# Patient Record
Sex: FEMALE | Race: ASIAN | NOT HISPANIC OR LATINO | URBAN - METROPOLITAN AREA
[De-identification: names, ages, dates, MRNs, and addresses within clinical notes are randomized per-mention and may not be internally consistent; named-entity substitution may affect disease eponyms.]

---

## 2021-11-29 ENCOUNTER — INPATIENT (INPATIENT)
Facility: HOSPITAL | Age: 26
LOS: 7 days | Discharge: HOME | DRG: 885 | End: 2021-12-07
Attending: PSYCHIATRY & NEUROLOGY | Admitting: PSYCHIATRY & NEUROLOGY
Payer: COMMERCIAL

## 2021-11-29 VITALS
HEIGHT: 66 IN | RESPIRATION RATE: 18 BRPM | OXYGEN SATURATION: 100 % | HEART RATE: 86 BPM | DIASTOLIC BLOOD PRESSURE: 74 MMHG | WEIGHT: 190.04 LBS | SYSTOLIC BLOOD PRESSURE: 101 MMHG

## 2021-11-29 LAB
ALBUMIN SERPL ELPH-MCNC: 3.1 G/DL — LOW (ref 3.4–5)
ALP SERPL-CCNC: 52 U/L — SIGNIFICANT CHANGE UP (ref 40–120)
ALT FLD-CCNC: 42 U/L — SIGNIFICANT CHANGE UP (ref 12–42)
ANION GAP SERPL CALC-SCNC: 9 MMOL/L — SIGNIFICANT CHANGE UP (ref 9–16)
APAP SERPL-MCNC: <2 UG/ML — LOW (ref 10–30)
AST SERPL-CCNC: 18 U/L — SIGNIFICANT CHANGE UP (ref 15–37)
BASOPHILS # BLD AUTO: 0.06 K/UL — SIGNIFICANT CHANGE UP (ref 0–0.2)
BASOPHILS NFR BLD AUTO: 0.4 % — SIGNIFICANT CHANGE UP (ref 0–2)
BILIRUB SERPL-MCNC: 0.3 MG/DL — SIGNIFICANT CHANGE UP (ref 0.2–1.2)
BUN SERPL-MCNC: 11 MG/DL — SIGNIFICANT CHANGE UP (ref 7–23)
CALCIUM SERPL-MCNC: 8.8 MG/DL — SIGNIFICANT CHANGE UP (ref 8.5–10.5)
CHLORIDE SERPL-SCNC: 105 MMOL/L — SIGNIFICANT CHANGE UP (ref 96–108)
CO2 SERPL-SCNC: 24 MMOL/L — SIGNIFICANT CHANGE UP (ref 22–31)
CREAT SERPL-MCNC: 0.81 MG/DL — SIGNIFICANT CHANGE UP (ref 0.5–1.3)
EOSINOPHIL # BLD AUTO: 0.17 K/UL — SIGNIFICANT CHANGE UP (ref 0–0.5)
EOSINOPHIL NFR BLD AUTO: 1 % — SIGNIFICANT CHANGE UP (ref 0–6)
ETHANOL SERPL-MCNC: <3 MG/DL — SIGNIFICANT CHANGE UP
GLUCOSE SERPL-MCNC: 97 MG/DL — SIGNIFICANT CHANGE UP (ref 70–99)
HCT VFR BLD CALC: 36.7 % — SIGNIFICANT CHANGE UP (ref 34.5–45)
HGB BLD-MCNC: 11.3 G/DL — LOW (ref 11.5–15.5)
IMM GRANULOCYTES NFR BLD AUTO: 0.6 % — SIGNIFICANT CHANGE UP (ref 0–1.5)
LYMPHOCYTES # BLD AUTO: 13.1 % — SIGNIFICANT CHANGE UP (ref 13–44)
LYMPHOCYTES # BLD AUTO: 2.17 K/UL — SIGNIFICANT CHANGE UP (ref 1–3.3)
MAGNESIUM SERPL-MCNC: 2.3 MG/DL — SIGNIFICANT CHANGE UP (ref 1.6–2.6)
MANUAL SMEAR VERIFICATION: SIGNIFICANT CHANGE UP
MCHC RBC-ENTMCNC: 25.6 PG — LOW (ref 27–34)
MCHC RBC-ENTMCNC: 30.8 GM/DL — LOW (ref 32–36)
MCV RBC AUTO: 83.2 FL — SIGNIFICANT CHANGE UP (ref 80–100)
MONOCYTES # BLD AUTO: 1.45 K/UL — HIGH (ref 0–0.9)
MONOCYTES NFR BLD AUTO: 8.7 % — SIGNIFICANT CHANGE UP (ref 2–14)
NEUTROPHILS # BLD AUTO: 12.66 K/UL — HIGH (ref 1.8–7.4)
NEUTROPHILS NFR BLD AUTO: 76.2 % — SIGNIFICANT CHANGE UP (ref 43–77)
NRBC # BLD: 0 /100 WBCS — SIGNIFICANT CHANGE UP (ref 0–0)
PCO2 BLDV: 51 MMHG — HIGH (ref 39–42)
PH BLDV: 7.37 — SIGNIFICANT CHANGE UP (ref 7.32–7.43)
PLAT MORPH BLD: ABNORMAL
PLATELET # BLD AUTO: 272 K/UL — SIGNIFICANT CHANGE UP (ref 150–400)
PLATELET CLUMP BLD QL SMEAR: SLIGHT
PO2 BLDV: 51 MMHG — HIGH (ref 25–45)
POTASSIUM SERPL-MCNC: 3.5 MMOL/L — SIGNIFICANT CHANGE UP (ref 3.5–5.3)
POTASSIUM SERPL-SCNC: 3.5 MMOL/L — SIGNIFICANT CHANGE UP (ref 3.5–5.3)
PROT SERPL-MCNC: 7.3 G/DL — SIGNIFICANT CHANGE UP (ref 6.4–8.2)
RBC # BLD: 4.41 M/UL — SIGNIFICANT CHANGE UP (ref 3.8–5.2)
RBC # FLD: 14.4 % — SIGNIFICANT CHANGE UP (ref 10.3–14.5)
RBC BLD AUTO: NORMAL — SIGNIFICANT CHANGE UP
SALICYLATES SERPL-MCNC: 0.6 MG/DL — LOW (ref 2.8–20)
SAO2 % BLDV: 81.5 % — SIGNIFICANT CHANGE UP (ref 67–88)
SARS-COV-2 RNA SPEC QL NAA+PROBE: SIGNIFICANT CHANGE UP
SODIUM SERPL-SCNC: 138 MMOL/L — SIGNIFICANT CHANGE UP (ref 132–145)
WBC # BLD: 16.61 K/UL — HIGH (ref 3.8–10.5)
WBC # FLD AUTO: 16.61 K/UL — HIGH (ref 3.8–10.5)

## 2021-11-29 PROCEDURE — 90792 PSYCH DIAG EVAL W/MED SRVCS: CPT | Mod: 95

## 2021-11-29 PROCEDURE — 99220: CPT

## 2021-11-29 PROCEDURE — 71045 X-RAY EXAM CHEST 1 VIEW: CPT | Mod: 26

## 2021-11-29 PROCEDURE — 93010 ELECTROCARDIOGRAM REPORT: CPT

## 2021-11-29 RX ORDER — SODIUM CHLORIDE 9 MG/ML
1000 INJECTION, SOLUTION INTRAVENOUS ONCE
Refills: 0 | Status: COMPLETED | OUTPATIENT
Start: 2021-11-29 | End: 2021-11-29

## 2021-11-29 RX ADMIN — SODIUM CHLORIDE 1000 MILLILITER(S): 9 INJECTION, SOLUTION INTRAVENOUS at 20:17

## 2021-11-29 RX ADMIN — SODIUM CHLORIDE 1000 MILLILITER(S): 9 INJECTION, SOLUTION INTRAVENOUS at 22:00

## 2021-11-29 NOTE — ED PROVIDER NOTE - CLINICAL SUMMARY MEDICAL DECISION MAKING FREE TEXT BOX
Intentional drug overdose - xanax and Aripiprazole at 4 pm.  Hx of Bipolar II, UC.  Presents with somnolence and sleepiness.  No vomiting, no seizures.  Accompanied by friend.  On arrival, placed on 1:1.  Sleep, AVPU - verbal, PERRL, no rigidity.  Lungs clear, no abdominal distention.  Placed on cardiac monitor, labs and imaging.  Reviewed with toxicology fellow Dr. Stacy.  Recommends 5-6 hour observation and improved mental status prior to medical clearance for psychiatry evaluation.

## 2021-11-29 NOTE — ED ADULT NURSE NOTE - OBJECTIVE STATEMENT
Patient presented to the ED with cc of SI attempt. Patient reports taking 8 x 0.25mg Klonipin and 20-25x 5mg Abilify. Patient reports one previous SI attempt with pills in 2016. Patient has a PMH of UC, depression, and BV.

## 2021-11-29 NOTE — ED PROVIDER NOTE - CONSTITUTIONAL, MLM
Sleepy, rouses to verbal.  oriented to person, place, time/situation and in no apparent distress. normal...

## 2021-11-29 NOTE — ED PROVIDER NOTE - OBJECTIVE STATEMENT
BIBEMS for evaluation with friend for drug overdose.  As per friend received a text message around 430 pm that she wasn't feeling well.  When she arrived she found the patient to be sleepy appearing.  No vomiting appreciated.  Patient states that she has been more depressed over the past week with stressors at home with family being in [place], pressures at graduate school, and the holiday season.   Has been treated with prednisone for recent UC flare and flagyl for BV infection.   Around 4pm took 8 0.25 mg xanax pills and 20-25 5mg Abilify tablets together.  Denies other coingestion or alcohol use.   Something similar happened in [place] in 2016.  Denies other acts of self harm. BIBEMS for evaluation with friend for drug overdose.  As per friend received a text message around 430 pm that she wasn't feeling well.  When she arrived she found the patient to be sleepy appearing.  No vomiting appreciated.  History of Bipolar II and UC.  Patient states that she has been more depressed over the past week with stressors at home with family being in [place], pressures at graduate school, and the holiday season.   Has been treated with prednisone for recent UC flare and flagyl for BV infection.   Around 4pm took 8 0.25 mg xanax pills and 20-25 5mg Abilify tablets together.  Denies other coingestion or alcohol use.   Something similar happened in [place] in 2016.  Denies other acts of self harm.

## 2021-11-29 NOTE — ED CDU PROVIDER INITIAL DAY NOTE - PROGRESS NOTE DETAILS
Spoke with Telepsychiatry attending to present case for consult. [facility] requested legals for voluntary admission, patient signed, and voluntary/ amenable to bed placement. clinically stable for admission and transfer to psychiatry. no overnight events. bed search in progress per psych, will likely be placed this morning.

## 2021-11-29 NOTE — ED ADULT TRIAGE NOTE - CHIEF COMPLAINT QUOTE
Pt BIBEMS from home for suicide attempt. Pt states that she took about 30 pills of Klonopin and Abilify in attempt to kill herself. PT denies HI, auditory/visual hallucinations. PT states that she had a suicide attempt in 2016 and was hospitalized in [place] at the time.

## 2021-11-29 NOTE — ED ADULT NURSE REASSESSMENT NOTE - NS ED NURSE REASSESS COMMENT FT1
Pt received from previous shift RN. sleeping in stretcher, easily arousable. calm, in nad, respirations spont/reg/unlabored on RA. remains on CM, pending lab results and dispo  remains on 1:1 for SA

## 2021-11-29 NOTE — ED ADULT NURSE NOTE - SUICIDE RISK FACTORS
Hopelessness or despair/Chronic pain/Access to lethal methods (pills, firearm, etc.: Ask specifically about presence or absence of a firearm in the home or ease of accessing/Mood Disorder current/past

## 2021-11-29 NOTE — ED ADULT NURSE NOTE - NSIMPLEMENTINTERV_GEN_ALL_ED
Implemented All Universal Safety Interventions:  [place] to call system. Call bell, personal items and telephone within reach. Instruct patient to call for assistance. Room bathroom lighting operational. Non-slip footwear when patient is off stretcher. Physically safe environment: no spills, clutter or unnecessary equipment. Stretcher in lowest position, wheels locked, appropriate side rails in place.

## 2021-11-30 DIAGNOSIS — F32.9 MAJOR DEPRESSIVE DISORDER, SINGLE EPISODE, UNSPECIFIED: ICD-10-CM

## 2021-11-30 DIAGNOSIS — F60.3 BORDERLINE PERSONALITY DISORDER: ICD-10-CM

## 2021-11-30 DIAGNOSIS — F32.2 MAJOR DEPRESSIVE DISORDER, SINGLE EPISODE, SEVERE WITHOUT PSYCHOTIC FEATURES: ICD-10-CM

## 2021-11-30 LAB
AMPHET UR-MCNC: NEGATIVE — SIGNIFICANT CHANGE UP
BARBITURATES UR SCN-MCNC: NEGATIVE — SIGNIFICANT CHANGE UP
BENZODIAZ UR-MCNC: NEGATIVE — SIGNIFICANT CHANGE UP
COCAINE METAB.OTHER UR-MCNC: NEGATIVE — SIGNIFICANT CHANGE UP
HCG UR QL: NEGATIVE — SIGNIFICANT CHANGE UP
METHADONE UR-MCNC: NEGATIVE — SIGNIFICANT CHANGE UP
OPIATES UR-MCNC: NEGATIVE — SIGNIFICANT CHANGE UP
PCP SPEC-MCNC: SIGNIFICANT CHANGE UP
PCP UR-MCNC: NEGATIVE — SIGNIFICANT CHANGE UP
THC UR QL: NEGATIVE — SIGNIFICANT CHANGE UP

## 2021-11-30 PROCEDURE — 99217: CPT

## 2021-11-30 RX ORDER — ACETAMINOPHEN 500 MG
650 TABLET ORAL EVERY 6 HOURS
Refills: 0 | Status: DISCONTINUED | OUTPATIENT
Start: 2021-11-30 | End: 2021-12-07

## 2021-11-30 RX ORDER — MAGNESIUM HYDROXIDE 400 MG/1
30 TABLET, CHEWABLE ORAL DAILY
Refills: 0 | Status: DISCONTINUED | OUTPATIENT
Start: 2021-11-30 | End: 2021-12-07

## 2021-11-30 RX ORDER — LANOLIN ALCOHOL/MO/W.PET/CERES
3 CREAM (GRAM) TOPICAL AT BEDTIME
Refills: 0 | Status: DISCONTINUED | OUTPATIENT
Start: 2021-11-30 | End: 2021-12-04

## 2021-11-30 NOTE — PROGRESS NOTE BEHAVIORAL HEALTH - NSBHFUPINTERVALHXFT_PSY_A_CORE
chart reviewed; case discussed with primary team and overnight psychiatrist.   patient was interviewed for 15 minutes. She says that she is feeling somewhat better, but reiterates the history collected last evening by primary psychiatrist. She says that she has been feeling increasingly depressed and overwhelmed by a variety of stressors including physical illness (Crohns with intractable diarrhea), as well as isolation from family, not being able to travel for Thanksgiving and separation from boyfriend. She says that last night she became suicidal, and contacted her friend to take her dog away so that the dog would be cared for after she was dead. The friend refused to take the dog, saying she'd be fine, and she says "I don't think he understood the extent of my problem". Subsequently, her brother had indicated he would come over to be with her. At that point, she said that she felt it would be fine to die because somebody would find the dog and he would be cared for. She then took an overdose of xanax and klonopin hoping to sleep. When that didn't work, she then started to take handfuls of leftover abilify from a prior prescription. She says that she didn't know if she would die or not, but was fine with that outcome. She believes she took around 30 pills total between the benzos and the abilify. Right now she is ambivalent about being alive.     Spoke in person with pt's brother, who was in support of plan for admission. Answered his questions about the admissions process. Supportive interventions provided.

## 2021-11-30 NOTE — ED BEHAVIORAL HEALTH ASSESSMENT NOTE - OTHER PAST PSYCHIATRIC HISTORY (INCLUDE DETAILS REGARDING ONSET, COURSE OF ILLNESS, INPATIENT/OUTPATIENT TREATMENT)
Hx of Bipolar 2, told in the past possible Borderline Personality d/o. Saw a psychiatrist in [place] for about 6 years. 1 prior inpatient hospitalization in [place] in 2016 after suicide attempt by overdose.

## 2021-11-30 NOTE — ED BEHAVIORAL HEALTH ASSESSMENT NOTE - HPI (INCLUDE ILLNESS QUALITY, SEVERITY, DURATION, TIMING, CONTEXT, MODIFYING FACTORS, ASSOCIATED SIGNS AND SYMPTOMS)
Patient is a 26 year old student in a relationship domiciled alone non-caregiver with a history of Bipolar 2 d/o, 1 prior hospitalization, 1 prior suicide attempt by overdose in 2016 and hx self-injury by cutting, no past violence/arrests, no substance abuse issues, past medical history of Crohn's disease and ulcerative colitis BIBEMS activated by friend after overdose at home.     Patient states she felt like she was at her baseline until about 10 days ago when she was faced with multiple acute stressors. She had a flare up of her Crohn's disease around that time, states she has essentially been bed ridden with nausea/body aches/diarrhea with blood in stool. She had to miss returning home to [place] where she was supposed to celebrate Thanksgiving with her family and celebrate her brother's engagement, she also missed her best friend's wedding due to this. Patient also indicates compounding stress because she hasn't been able to focus on her graduate school finals which are in a few weeks. She reports feeling anxious thinking about what may happen and low mood because she is missing all these events that she is able to see on social media. She endorses low motivation and she has been sleeping up to 18 hours/day with poor hygiene. Patient reports poor social support as her [place] moved to the [place] for family reasons a few months ago and the rest of her family primarily lives in [place]. She states she has had low appetite especially due to physical GI symptoms. States during this period of 10 days she had intermittent passive "fleeting thoughts" of self-harm but didn't act. Admits that she took a few klonopin she had leftover during this period to help with the anxiety without harmful intent. Patient denies any psychotic/manic symptoms, no additional SIB, no HI/violence, no substance abuse. Today, patient reports She was feeling frustrated and "couldn't handle it anymore", "just wanted to sleep" so she reports taking about 8 Klonopin and 20-30 Abilify of her leftover medications. Patient is vague when asked whether this was a suicide attempt, states at this point she is unsure why she took them. Patient reports she is currently not suicidal while in the ED. She states shortly after taking the pills she called her friend and her brother, her friend arrived and reportedly found the pills and activated EMS.     She reports that she had been on Abilify 5mg for 6-8 months until she discontinued about 2-3 months ago because she felt she was doing well. She currently isn't seeing a therapist or psychiatrist, had stopped around the time of COVID. Reports she was trying to get an appointment with a psychiatrist in recent days but has been unable to. Patient states that she is hoping to re-start medication, is willing to be admitted at this time for stabilization.     Spoke with patient's brother who was present in the ED, states he doesn't have the full details of patient's psychiatric history but knows she was in treatment in [place] and had a hospitalization. He wasn't aware of past SA and wasn't aware of what had occurred today. He notes patient in recent weeks has definitely seemed more withdrawn from family than normal and that she was having GI issues so she missed important family events. Brother is supportive of admission for patient at this time.    Patient gives permission to obtain collateral from _Brother____:  ( x ) Yes  (  )  No  Rationale for overriding objection            (  ) Lack of capacity. Details: ________            (  ) Assessing risk of danger to self/others. Details: ________      Rationale for selecting specific collateral source            (  ) Potential to impact risk of danger to self/others and no alternative equivalent. Details: _____ Patient is a 26 year old student in a relationship domiciled alone non-caregiver with a history of Bipolar 2 d/o, 1 prior hospitalization, 1 prior suicide attempt by overdose in 2016 and hx self-injury by cutting, no past violence/arrests, no substance abuse issues, past medical history of Crohn's disease and ulcerative colitis BIBEMS activated by friend after overdose at home with Xanex approx 8.25mg Xanex and 20-25 pills of Abilify 5mg .     Patient states she felt like she was at her baseline until about 10 days ago when she was faced with multiple acute stressors. She had a flare up of her Crohn's disease around that time, states she has essentially been bed ridden with nausea/body aches/diarrhea with blood in stool. She had to miss returning home to [place] where she was supposed to celebrate Thanksgiving with her family and celebrate her brother's engagement, she also missed her best friend's wedding due to this. Patient also indicates compounding stress because she hasn't been able to focus on her graduate school finals which are in a few weeks. She reports feeling anxious thinking about what may happen and low mood because she is missing all these events that she is able to see on social media. She endorses low motivation and she has been sleeping up to 18 hours/day with poor hygiene. Patient reports poor social support as her partner moved to the [place] for family reasons a few months ago and the rest of her family primarily lives in [place]. She states she has had low appetite especially due to physical GI symptoms. States during this period of 10 days she had intermittent passive "fleeting thoughts" of self-harm but didn't act. Admits that she took a few klonopin she had leftover during this period to help with the anxiety without harmful intent. Patient denies any psychotic/manic symptoms, no additional SIB, no HI/violence, no substance abuse. Today, patient reports She was feeling frustrated and "couldn't handle it anymore", "just wanted to sleep" so she reports taking about 8 Klonopin/Xanex? and 20-30 Abilify of her leftover medications. Patient is vague when asked whether this was a suicide attempt-- appeared guarded, states at this point she is unsure why she took them. Patient reports she is currently not suicidal while in the ED. She states shortly after taking the pills she called her friend and her brother, her friend arrived and reportedly found the pills and activated EMS.     She reports that she had been on Abilify 5mg for 6-8 months until she discontinued about 2-3 months ago because she felt she was doing well. She currently isn't seeing a therapist or psychiatrist, had stopped around the time of COVID. Reports she was trying to get an appointment with a psychiatrist in recent days but has been unable to. Patient states that she is hoping to re-start medication, is willing to be admitted at this time for stabilization.     Spoke with patient's brother who was present in the ED, states he doesn't have the full details of patient's psychiatric history but knows she was in treatment in [place] and had a hospitalization. He wasn't aware of past SA and wasn't aware of what had occurred today. He notes patient in recent weeks has definitely seemed more withdrawn from family than normal and that she was having GI issues so she missed important family events. Brother is supportive of admission for patient at this time.    Patient gives permission to obtain collateral from _Brother____:  ( x ) Yes  (  )  No  Rationale for overriding objection            (  ) Lack of capacity. Details: ________            (  ) Assessing risk of danger to self/others. Details: ________      Rationale for selecting specific collateral source            (  ) Potential to impact risk of danger to self/others and no alternative equivalent. Details: _____

## 2021-11-30 NOTE — ED ADULT NURSE REASSESSMENT NOTE - NS ED NURSE REASSESS COMMENT FT1
Pt awaiting bed placement for a voluntary psych admission at this time. Pt asked this RN if its possible to rescind decision, due to long wait for bed. Provider made aware
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
## 2021-11-30 NOTE — ED BEHAVIORAL HEALTH ASSESSMENT NOTE - SUMMARY
Patient is a 26 year old student in a relationship domiciled alone non-caregiver with a history of Bipolar 2 d/o, 1 prior hospitalization, 1 prior suicide attempt by overdose in 2016 and hx self-injury by cutting, no past violence/arrests, no substance abuse issues, past medical history of Crohn's disease and ulcerative colitis BIBEMS activated by friend after overdose at home with Xanex approx 8.25mg Xanex and 20-25 pills of Abilify 5mg .     Patient currently presents with depressive symptoms in the context of recent OD, suspected suicide attempt. Patient was guarded and not really able to discuss or safety plan. Although she currently denies any SI in the ER, attempt is concerning and will plan on admitting to psychiatry 9.27 involuntary for safety and stabilization via titration of medications. Currently is not safe to be discharged, and there are a number of ongoing stressors (medical, poor social support).     Discussed with ER elevated wbc 16. Per ER suspect demarginalization secondary to stress as the cause, no signs of infection.

## 2021-11-30 NOTE — PROGRESS NOTE BEHAVIORAL HEALTH - NSBHFUPREASONCONS_PSY_A_CORE
DISCHARGE SUMMARY:   Discharge Time: 1030  Via:  carseat: Rear-facing carseat   Accompanied by:  Parents  Verbal Discharge Instructions given to:parents  Verbalized understanding: Yes  Written Instructions given: Yes  Baby Bands Matched and Signature Obtained: Yes  Discharged Stable Per MD Order: Yes       suicidality

## 2021-11-30 NOTE — ED ADULT NURSE REASSESSMENT NOTE - NS ED NURSE REASSESS COMMENT FT1
report received from previous RN, pt sleeping at this time, remains on continuous observation, speaking to telepsych. Will continue to monitor.

## 2021-11-30 NOTE — BH PATIENT PROFILE - FALL HARM RISK - UNIVERSAL INTERVENTIONS
Bed in lowest position, wheels locked, appropriate side rails in place/Call bell, personal items and telephone in reach/Instruct patient to call for assistance before getting out of bed or chair/Non-slip footwear when patient is out of bed/[place] to call system/Physically safe environment - no spills, clutter or unnecessary equipment/Purposeful Proactive Rounding/Room/bathroom lighting operational, light cord in reach

## 2021-11-30 NOTE — ED BEHAVIORAL HEALTH ASSESSMENT NOTE - DESCRIPTION
Patient BIBEMS, alert and oriented, cooperative. Per triage indicated this was a suicide attempt by overdose, depressed mood with congruent affect, no HI, not overtly psychotic/manic, thought process/speech WNL, patient remained in good behavioral control prior to assessment.    COVID SCREENER:    Patient states no recent testing/anitbodies, received 2 doses of Pfizer, 2nd dose several months ago, no recent travel out of state or exposure to anyone with COVID. Resides alone. Has a boyfriend who resides in the [place]. 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 Patient's family primarily resides in [place]. Patient returned from [place] in August after a long period to attend school. Patient never , no children. see above hpi

## 2021-11-30 NOTE — ED BEHAVIORAL HEALTH ASSESSMENT NOTE - RISK ASSESSMENT
RF: poor social support, hx of hospitalization, medical illness, hx self injury and cutting, hx Sa via OD not able to reflect on past events or discuss, not able to safety plan   PF: currently denies SI Moderate Acute Suicide Risk

## 2021-11-30 NOTE — ED BEHAVIORAL HEALTH ASSESSMENT NOTE - CURRENT MEDICATION
Abilify 5 and [place]    iSTOP:  Search Terms: Roosevelt [place], 1995Search Date: 11/30/2021 04:22:48 AM  Searching on behalf of: Myself  The Drug Utilization Report below displays all of the controlled substance prescriptions, if any, that your patient has filled in the last twelve months. The information displayed on this report is compiled from pharmacy submissions to the Department, and accurately reflects the information as submitted by the pharmacies.    This report was requested by: Serina Shine | Reference #: [id number]    There are no results for the search terms that you entered.

## 2021-11-30 NOTE — ED BEHAVIORAL HEALTH ASSESSMENT NOTE - CASE SUMMARY
25yo F, student, domiciled alone, away from boyfriend and family, medical history of Crohn's disease and UC, past psychiatric history of Bipolar 2, 1 prior psych hospitalization, 1 prior SA via OD, and history of cutting, no hx aggression or substance who was BIBEMS activated by friend after she was found down following OD of benzo and Abilify. Given concerns that OD was SA in the context of stressors (being alone, medical issues, stress) will plan on involuntary psych admission for safety and stabilization. Not really able to openly discuss SA during this interview and patient presented more guarded, denying SI. Not able to safety plan either.

## 2021-11-30 NOTE — PROGRESS NOTE BEHAVIORAL HEALTH - SUMMARY
26 year old student in a relationship domiciled alone non-caregiver with a history of Bipolar 2 d/o, 1 prior hospitalization, 1 prior suicide attempt by overdose in 2016 and hx self-injury by cutting, no past violence/arrests, no substance abuse issues, past medical history of Crohn's disease and ulcerative colitis BIBEMS activated by friend after overdose at home with Xanex approx 8.25mg Xanex and 20-25 pills of Abilify 5mg .   Patient slept overnight but remains ambivalent about symptoms and treatment. She is clear about her suicidal intent, with acts of furtherance to secure care for her pet and subsequently making large overdose with intent to die. She will require admission for safety and stabilization.

## 2021-12-01 LAB
A1C WITH ESTIMATED AVERAGE GLUCOSE RESULT: 5.9 % — HIGH (ref 4–5.6)
ESTIMATED AVERAGE GLUCOSE: 123 MG/DL — HIGH (ref 68–114)
HCG SERPL-ACNC: <0 MIU/ML — SIGNIFICANT CHANGE UP
MAGNESIUM SERPL-MCNC: 2.1 MG/DL — SIGNIFICANT CHANGE UP (ref 1.6–2.6)
TSH SERPL-MCNC: 1.35 UIU/ML — SIGNIFICANT CHANGE UP (ref 0.27–4.2)

## 2021-12-01 PROCEDURE — 99223 1ST HOSP IP/OBS HIGH 75: CPT

## 2021-12-01 RX ORDER — TRAZODONE HCL 50 MG
50 TABLET ORAL DAILY
Refills: 0 | Status: DISCONTINUED | OUTPATIENT
Start: 2021-12-01 | End: 2021-12-07

## 2021-12-01 NOTE — BH SOCIAL WORK INITIAL PSYCHOSOCIAL EVALUATION - NSPTSTATEDGOAL_PSY_ALL_CORE
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

## 2021-12-01 NOTE — BH SOCIAL WORK INITIAL PSYCHOSOCIAL EVALUATION - NSBHGOALSUCCESSFT_PSY_ALL_CORE
Patient has sought help after a suicide attempt in 2013 and after depressive/hypomanic episodes. Patient is able to identify symptoms, has good insight, and triggers.

## 2021-12-01 NOTE — BH INPATIENT PSYCHIATRY ASSESSMENT NOTE - RISK ASSESSMENT
Static: hx of mental illness, prior hx of suicide attempt, family hx of depression  Modifiable: current mood sxs, access to treatment/care  Protective: involved family, stable housing
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
## 2021-12-01 NOTE — BH SOCIAL WORK INITIAL PSYCHOSOCIAL EVALUATION - NSHIGHRISKBEHFT_PSY_ALL_CORE
Previous suicide attempt in 2013 by overdose on Abilify  Previous suicide attempt in 2013 by overdose on medication

## 2021-12-01 NOTE — BH INPATIENT PSYCHIATRY ASSESSMENT NOTE - DESCRIPTION
Resides alone. Has a boyfriend who resides in the [place]. 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 Patient's family primarily resides in [place]. Patient returned from [place] in August after a long period to attend school. Patient never , no children.

## 2021-12-01 NOTE — BH INPATIENT PSYCHIATRY ASSESSMENT NOTE - CURRENT MEDICATION
MEDICATIONS  (STANDING):    MEDICATIONS  (PRN):  acetaminophen     Tablet .. 650 milliGRAM(s) Oral every 6 hours PRN Moderate Pain (4 - 6)  aluminum hydroxide/magnesium hydroxide/simethicone Suspension 30 milliLiter(s) Oral every 6 hours PRN Dyspepsia  LORazepam     Tablet 2 milliGRAM(s) Oral every 6 hours PRN Symptom-triggered: each CIWA -Ar score 8 or GREATER  magnesium hydroxide Suspension 30 milliLiter(s) Oral daily PRN Constipation  melatonin. 3 milliGRAM(s) Oral at bedtime PRN Insomnia

## 2021-12-01 NOTE — CHART NOTE - NSCHARTNOTEFT_GEN_A_CORE
As per ED case formulation: " 26 year old student in a relationship domiciled alone non-caregiver with a history of Bipolar 2 d/o, 1 prior hospitalization, 1 prior suicide attempt by overdose in 2016 and hx self-injury by cutting, no past violence/arrests, no substance abuse issues, past medical history of Crohn's disease and ulcerative colitis BIBEMS activated by friend after overdose at home with Xanex approx 8.25mg Xanex and 20-25 pills of Abilify 5mg .   Patient slept overnight but remains ambivalent about symptoms and treatment. She is clear about her suicidal intent, with acts of furtherance to secure care for her pet and subsequently making large overdose with intent to die. She will require admission for safety and stabilization."    Patient arrive in behavioral control. VSS. Denies any physical symptoms, side effects, or withdrawal symptoms. Asked when she could be discharged. Patient denies SI/HI. Agreeable to be seen tomorrow morning by primary team. Of note, documentation from ED indicates that patient is for involuntary admission but she signed voluntary status instead    Plan:  - Admit under voluntary status  - Hold all medications currently following recent suicide attempt  - Ativan 2mg q8hr PRN CIWA >8 due to recent benzodiazepine overdose  - Tylenol, maalox, milk of magnesia, melatonin PRN
[facility]  PHYSICAL EXAM: Agree/Declined    VITALS: T(C): 36.6 (11-30-21 @ 20:19), Max: 36.8 (11-30-21 @ 09:27)  HR: 90 (11-30-21 @ 20:19) (84 - 100)  BP: 111/76 (11-30-21 @ 20:19) (106/64 - 112/74)  RR: 16 (11-30-21 @ 20:19) (16 - 16)  SpO2: 99% (11-30-21 @ 20:19) (98% - 99%)      GENERAL: NAD, comfortable, ambulating  HEAD:  Atraumatic, Normocephalic  EYES: EOMI, PERRLA, conjunctiva and sclera clear  ENT: Moist mucous membranes  NECK: Supple, No JVD  CHEST/LUNG: Clear to auscultation bilaterally; No rales, rhonchi, wheezing, or rubs. Unlabored respirations  HEART: Regular rate and rhythm; No murmurs, rubs, or gallops  ABDOMEN: BSx4; Soft, nontender, nondistended  EXTREMITIES:  No clubbing, cyanosis, or edema  NERVOUS SYSTEM:  A&Ox3, no focal deficits   SKIN: No rashes or lesions
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
## 2021-12-01 NOTE — BH INPATIENT PSYCHIATRY ASSESSMENT NOTE - NSBHCHARTREVIEWVS_PSY_A_CORE FT
Vital Signs Last 24 Hrs  T(C): 36.8 (12-01-21 @ 08:45), Max: 36.8 (12-01-21 @ 08:45)  T(F): 98.3 (12-01-21 @ 08:45), Max: 98.3 (12-01-21 @ 08:45)  HR: 98 (12-01-21 @ 08:45) (90 - 98)  BP: 115/66 (12-01-21 @ 08:45) (109/71 - 115/66)  BP(mean): --  RR: 16 (12-01-21 @ 08:45) (16 - 16)  SpO2: 98% (12-01-21 @ 08:45) (98% - 99%)

## 2021-12-01 NOTE — BH SOCIAL WORK INITIAL PSYCHOSOCIAL EVALUATION - NSCMSPTSTRENGTHS_PSY_ALL_CORE
Assertive/Compliance to treatment/Expressive of emotions/Financial stability/Future/goal oriented/Highly motivated for treatment/Intelligence/Resourceful/Self confidence/Strong support system/Successful coping history/Supportive family

## 2021-12-01 NOTE — BH INPATIENT PSYCHIATRY ASSESSMENT NOTE - NSBHMETABOLIC_PSY_ALL_CORE_FT
BMI: BMI (kg/m2): 30.7 (11-29-21 @ 19:06)  HbA1c: A1C with Estimated Average Glucose Result: 5.9 % (12-01-21 @ 07:27)    Glucose: POCT Blood Glucose.: 99 mg/dL (11-29-21 @ 19:31)    BP: 115/66 (12-01-21 @ 08:45) (101/74 - 115/66)  Lipid Panel:

## 2021-12-02 LAB
A1C WITH ESTIMATED AVERAGE GLUCOSE RESULT: 5.6 % — SIGNIFICANT CHANGE UP (ref 4–5.6)
ALBUMIN SERPL ELPH-MCNC: 4.5 G/DL — SIGNIFICANT CHANGE UP (ref 3.3–5)
ALP SERPL-CCNC: 55 U/L — SIGNIFICANT CHANGE UP (ref 40–120)
ALT FLD-CCNC: 36 U/L — SIGNIFICANT CHANGE UP (ref 10–45)
ANION GAP SERPL CALC-SCNC: 10 MMOL/L — SIGNIFICANT CHANGE UP (ref 5–17)
AST SERPL-CCNC: 21 U/L — SIGNIFICANT CHANGE UP (ref 10–40)
BASOPHILS # BLD AUTO: 0.07 K/UL — SIGNIFICANT CHANGE UP (ref 0–0.2)
BASOPHILS NFR BLD AUTO: 0.6 % — SIGNIFICANT CHANGE UP (ref 0–2)
BILIRUB SERPL-MCNC: <0.2 MG/DL — SIGNIFICANT CHANGE UP (ref 0.2–1.2)
BUN SERPL-MCNC: 15 MG/DL — SIGNIFICANT CHANGE UP (ref 7–23)
CALCIUM SERPL-MCNC: 9.6 MG/DL — SIGNIFICANT CHANGE UP (ref 8.4–10.5)
CHLORIDE SERPL-SCNC: 103 MMOL/L — SIGNIFICANT CHANGE UP (ref 96–108)
CHOLEST SERPL-MCNC: 214 MG/DL — HIGH
CO2 SERPL-SCNC: 27 MMOL/L — SIGNIFICANT CHANGE UP (ref 22–31)
CREAT SERPL-MCNC: 0.81 MG/DL — SIGNIFICANT CHANGE UP (ref 0.5–1.3)
CRP SERPL-MCNC: 16.5 MG/L — HIGH (ref 0–4)
EOSINOPHIL # BLD AUTO: 0.23 K/UL — SIGNIFICANT CHANGE UP (ref 0–0.5)
EOSINOPHIL NFR BLD AUTO: 2 % — SIGNIFICANT CHANGE UP (ref 0–6)
ESTIMATED AVERAGE GLUCOSE: 114 MG/DL — SIGNIFICANT CHANGE UP (ref 68–114)
GLUCOSE SERPL-MCNC: 108 MG/DL — HIGH (ref 70–99)
HCT VFR BLD CALC: 39.9 % — SIGNIFICANT CHANGE UP (ref 34.5–45)
HDLC SERPL-MCNC: 53 MG/DL — SIGNIFICANT CHANGE UP
HGB BLD-MCNC: 11.9 G/DL — SIGNIFICANT CHANGE UP (ref 11.5–15.5)
IMM GRANULOCYTES NFR BLD AUTO: 0.4 % — SIGNIFICANT CHANGE UP (ref 0–1.5)
LIPID PNL WITH DIRECT LDL SERPL: 130 MG/DL — HIGH
LYMPHOCYTES # BLD AUTO: 18.1 % — SIGNIFICANT CHANGE UP (ref 13–44)
LYMPHOCYTES # BLD AUTO: 2.13 K/UL — SIGNIFICANT CHANGE UP (ref 1–3.3)
MAGNESIUM SERPL-MCNC: 2 MG/DL — SIGNIFICANT CHANGE UP (ref 1.6–2.6)
MCHC RBC-ENTMCNC: 24.9 PG — LOW (ref 27–34)
MCHC RBC-ENTMCNC: 29.8 GM/DL — LOW (ref 32–36)
MCV RBC AUTO: 83.5 FL — SIGNIFICANT CHANGE UP (ref 80–100)
MONOCYTES # BLD AUTO: 0.8 K/UL — SIGNIFICANT CHANGE UP (ref 0–0.9)
MONOCYTES NFR BLD AUTO: 6.8 % — SIGNIFICANT CHANGE UP (ref 2–14)
NEUTROPHILS # BLD AUTO: 8.51 K/UL — HIGH (ref 1.8–7.4)
NEUTROPHILS NFR BLD AUTO: 72.1 % — SIGNIFICANT CHANGE UP (ref 43–77)
NON HDL CHOLESTEROL: 161 MG/DL — HIGH
NRBC # BLD: 0 /100 WBCS — SIGNIFICANT CHANGE UP (ref 0–0)
PHOSPHATE SERPL-MCNC: 4.1 MG/DL — SIGNIFICANT CHANGE UP (ref 2.5–4.5)
PLATELET # BLD AUTO: 449 K/UL — HIGH (ref 150–400)
POTASSIUM SERPL-MCNC: 4.1 MMOL/L — SIGNIFICANT CHANGE UP (ref 3.5–5.3)
POTASSIUM SERPL-SCNC: 4.1 MMOL/L — SIGNIFICANT CHANGE UP (ref 3.5–5.3)
PROT SERPL-MCNC: 8.5 G/DL — HIGH (ref 6–8.3)
RBC # BLD: 4.78 M/UL — SIGNIFICANT CHANGE UP (ref 3.8–5.2)
RBC # FLD: 14.6 % — HIGH (ref 10.3–14.5)
SODIUM SERPL-SCNC: 140 MMOL/L — SIGNIFICANT CHANGE UP (ref 135–145)
TRIGL SERPL-MCNC: 157 MG/DL — HIGH
WBC # BLD: 11.79 K/UL — HIGH (ref 3.8–10.5)
WBC # FLD AUTO: 11.79 K/UL — HIGH (ref 3.8–10.5)

## 2021-12-02 PROCEDURE — 99254 IP/OBS CNSLTJ NEW/EST MOD 60: CPT

## 2021-12-02 PROCEDURE — 90791 PSYCH DIAGNOSTIC EVALUATION: CPT

## 2021-12-02 PROCEDURE — 99233 SBSQ HOSP IP/OBS HIGH 50: CPT

## 2021-12-02 RX ORDER — ARIPIPRAZOLE 15 MG/1
5 TABLET ORAL DAILY
Refills: 0 | Status: DISCONTINUED | OUTPATIENT
Start: 2021-12-02 | End: 2021-12-03

## 2021-12-02 RX ADMIN — ARIPIPRAZOLE 5 MILLIGRAM(S): 15 TABLET ORAL at 14:22

## 2021-12-02 NOTE — CONSULT NOTE ADULT - SUBJECTIVE AND OBJECTIVE BOX
INITIAL GI CONSULT NOTE:     HPI:  26F with PMH of Ulcerative Colitis (first diagnosed in 2018, on Humira infusions q2 weeks, follows with Dr. hCristopher Mcclellan at [facility]), bipolar disorder II, possible borderline personality disorder, and pelvic pain (unknown origin, being worked up by OB/GYN), who presented after ingestion of Abilify and Klonopin (ingested 20 and 8 pills, respectively) and was initially admitted to inpatient psych due to concern for possible suicide attempt. GI consulted given history of UC and patient reporting UC flare symptoms (diarrhea 10-12 episodes per day, blood in stool, abdominal pain) x 2 weeks. Patient states that she first developed these symptoms and went to an urgent care on 11/19. Stool studies (she thinks c diff) were sent but she cannot remember exactly what was checked and patient's symptoms persisted, so she saw her GI (Dr. Mcclellan) on 11/23. Dr. Mcclellan reviewed the stool studies from urgent care, ordered Humira level, and switched patient to prednisone and mesalamine, which she took for 3 days (11/23-11/26 approximately). CRP per patient at that time 12-13. Patient was also having pelvic pain at the time, so was seen by OB/GYN, who diagnosed her with bacterial vaginosis, for which she took metronidazole x 3 days. Patient quit taking all of these medications because they did not improve her symptoms and she started feeling as if they were causing mood instability. Patient called Dr. Mcclellan back who recommended that she try taking Uceris but she never filled this prescription. Patient's symptoms continued to worsen, so she became depressed, prompting her to take Abilify 5 mg x 20 pills and Klonopin 0.25 mg x 8 pills. She started feeling drowsy so called 911 and was brought to [facility]. Patient states that she is still experiencing abdominal pain and diarrhea up to 10x a day with some blood in stool. No longer experiencing any BV symptoms (no malodorous discharge). Otherwise denies n/v/, constipation, fever, chills, SOB, CP, dysuria, or increased urinary frequency. States that she drinks ETOH (1x per week). Denies drug use, smoking, and is in a monogamous relationship. Last colonoscopy per patient was in Jan 2021 and revealed pancolitis.       Allergies  No Known Allergies  Intolerances      Home Medications:  MEDICATIONS:  MEDICATIONS  (STANDING):  ARIPiprazole 5 milliGRAM(s) Oral daily    MEDICATIONS  (PRN):  acetaminophen     Tablet .. 650 milliGRAM(s) Oral every 6 hours PRN Moderate Pain (4 - 6)  aluminum hydroxide/magnesium hydroxide/simethicone Suspension 30 milliLiter(s) Oral every 6 hours PRN Dyspepsia  LORazepam     Tablet 2 milliGRAM(s) Oral every 6 hours PRN Symptom-triggered: each CIWA -Ar score 8 or GREATER  magnesium hydroxide Suspension 30 milliLiter(s) Oral daily PRN Constipation  melatonin. 3 milliGRAM(s) Oral at bedtime PRN Insomnia  traZODone 50 milliGRAM(s) Oral daily PRN 2nd line insomnia    PAST MEDICAL & SURGICAL HISTORY:  Ulcerative colitis    Depression    No significant past surgical history      FAMILY HISTORY:  No pertinent family history in first degree relatives      SOCIAL HISTORY:  See above.     REVIEW OF SYSTEMS:  CONSTITUTIONAL: No weakness, fevers or chills  HEENT: No visual changes; No vertigo or throat pain   NECK: No pain or stiffness  RESPIRATORY: No cough, wheezing, hemoptysis; No shortness of breath  CARDIOVASCULAR: No chest pain or palpitations  GASTROINTESTINAL: No abdominal or epigastric pain. No nausea, vomiting, or hematemesis; No diarrhea or constipation. No melena or hematochezia.  GENITOURINARY: No dysuria, frequency or hematuria  NEUROLOGICAL: No numbness or weakness  SKIN: No itching, burning, rashes, or lesions   All other 10 review of systems is negative unless indicated above.    Vital Signs Last 24 Hrs  T(C): 36.5 (02 Dec 2021 08:30), Max: 36.8 (01 Dec 2021 16:30)  T(F): 97.7 (02 Dec 2021 08:30), Max: 98.2 (01 Dec 2021 16:30)  HR: 114 (02 Dec 2021 08:30) (101 - 114)  BP: 97/65 (02 Dec 2021 08:30) (97/65 - 115/77)  BP(mean): --  RR: 18 (02 Dec 2021 08:30) (18 - 18)  SpO2: 98% (02 Dec 2021 08:30) (98% - 99%)      PHYSICAL EXAM:  General: Well developed; well nourished; in no acute distress  Eyes: Anicteric sclerae, moist conjunctivae  HENT: Moist mucous membranes   Neck: Trachea midline, supple  Lungs: Normal respiratory effort, no intercostal retractions, non-labored breathing   Cardiovascular: RRR, +S1. +S2  Abdomen: Soft, non-tender non-distended; Normal bowel sounds; No rebound or guarding  Extremities: Normal range of motion, No clubbing, cyanosis or edema  Neurological: Alert and oriented x3, no focal deficits, UE and LE strength WNL   Skin: Warm and dry. No obvious rash      LABS:  Mg     2.1     12-01    RADIOLOGY & ADDITIONAL STUDIES:     Reviewed     INITIAL GI CONSULT NOTE:     HPI:  26F with PMH of Ulcerative Colitis (first diagnosed in 2018, on Humira infusions q2 weeks, follows with Dr. Christopher Mcclellan at [facility]), bipolar disorder II, possible borderline personality disorder, and pelvic pain (unknown origin, being worked up by OB/GYN), who presented after ingestion of Abilify and Klonopin (ingested 20 and 8 pills, respectively) and was initially admitted to inpatient psych due to concern for possible suicide attempt. GI consulted given history of UC and patient reporting UC flare symptoms (diarrhea 10-12 episodes per day, blood in stool, abdominal pain) x 2 weeks. Patient states that she first developed these symptoms and went to an urgent care on 11/19. Stool studies (she thinks c diff) were sent but she cannot remember exactly what was checked and patient's symptoms persisted, so she saw her GI (Dr. Mcclellan) on 11/23. Dr. Mcclellan reviewed the stool studies from urgent care, ordered Humira level, and switched patient to prednisone and mesalamine, which she took for 3 days (11/23-11/26 approximately). CRP per patient at that time 12-13. Patient was also having pelvic pain at the time, so was seen by OB/GYN, who diagnosed her with bacterial vaginosis, for which she took metronidazole x 3 days. Patient quit taking all of these medications because they did not improve her symptoms and she started feeling as if they were causing mood instability. Patient called Dr. Mcclellan back who recommended that she try taking Uceris but she never filled this prescription. Patient's symptoms continued to worsen, so she became depressed, prompting her to take Abilify 5 mg x 20 pills and Klonopin 0.25 mg x 8 pills. She started feeling drowsy so called 911 and was brought to [facility]. Patient states that she is still experiencing abdominal pain and diarrhea up to 10x a day with some blood in stool. No longer experiencing any BV symptoms (no malodorous discharge). Additionally notes that she may have anal fissures, painful, doesn't recall location on rectum though. Otherwise denies n/v/, constipation, fever, chills, SOB, CP, dysuria, or increased urinary frequency. States that she drinks ETOH (1x per week). Denies drug use, smoking, and is in a monogamous relationship. Last colonoscopy per patient was in Jan 2021 and revealed pancolitis.       Allergies  No Known Allergies  Intolerances      Home Medications:  MEDICATIONS:  MEDICATIONS  (STANDING):  ARIPiprazole 5 milliGRAM(s) Oral daily    MEDICATIONS  (PRN):  acetaminophen     Tablet .. 650 milliGRAM(s) Oral every 6 hours PRN Moderate Pain (4 - 6)  aluminum hydroxide/magnesium hydroxide/simethicone Suspension 30 milliLiter(s) Oral every 6 hours PRN Dyspepsia  LORazepam     Tablet 2 milliGRAM(s) Oral every 6 hours PRN Symptom-triggered: each CIWA -Ar score 8 or GREATER  magnesium hydroxide Suspension 30 milliLiter(s) Oral daily PRN Constipation  melatonin. 3 milliGRAM(s) Oral at bedtime PRN Insomnia  traZODone 50 milliGRAM(s) Oral daily PRN 2nd line insomnia    PAST MEDICAL & SURGICAL HISTORY:  Ulcerative colitis    Depression    No significant past surgical history      FAMILY HISTORY:  No pertinent family history in first degree relatives      SOCIAL HISTORY:  See above.     REVIEW OF SYSTEMS:  CONSTITUTIONAL: No weakness, fevers or chills  HEENT: No visual changes; No vertigo or throat pain   NECK: No pain or stiffness  RESPIRATORY: No cough, wheezing, hemoptysis; No shortness of breath  CARDIOVASCULAR: No chest pain or palpitations  GASTROINTESTINAL: No abdominal or epigastric pain. No nausea, vomiting, or hematemesis; No diarrhea or constipation. No melena or hematochezia.  GENITOURINARY: No dysuria, frequency or hematuria  NEUROLOGICAL: No numbness or weakness  SKIN: No itching, burning, rashes, or lesions   All other 10 review of systems is negative unless indicated above.    Vital Signs Last 24 Hrs  T(C): 36.5 (02 Dec 2021 08:30), Max: 36.8 (01 Dec 2021 16:30)  T(F): 97.7 (02 Dec 2021 08:30), Max: 98.2 (01 Dec 2021 16:30)  HR: 114 (02 Dec 2021 08:30) (101 - 114)  BP: 97/65 (02 Dec 2021 08:30) (97/65 - 115/77)  BP(mean): --  RR: 18 (02 Dec 2021 08:30) (18 - 18)  SpO2: 98% (02 Dec 2021 08:30) (98% - 99%)      PHYSICAL EXAM:  General: Well developed; well nourished; in no acute distress  Eyes: Anicteric sclerae, moist conjunctivae  HENT: Moist mucous membranes   Neck: Trachea midline, supple  Lungs: Normal respiratory effort, no intercostal retractions, non-labored breathing   Cardiovascular: RRR, +S1. +S2  Abdomen: Soft, non-tender non-distended; Normal bowel sounds; No rebound or guarding  Rectal: external hemorrhoids, no anal fissures  Extremities: Normal range of motion, No clubbing, cyanosis or edema  Neurological: Alert and oriented x3, no focal deficits, UE and LE strength WNL   Skin: Warm and dry. No obvious rash      LABS:  Mg     2.1     12-01    RADIOLOGY & ADDITIONAL STUDIES:     Reviewed

## 2021-12-02 NOTE — BH INPATIENT PSYCHIATRY PROGRESS NOTE - NSBHFUPINTERVALHXFT_PSY_A_CORE
Patient visible on the unit seen seated by nursing station. Sanjay, cooperative on approach. She is discharge focused, stating that now that her brother is back and her mother is enroute to [place] from [place] she feels better because she will have the opportunity to share with them how she feels  Patient visible on the unit seen seated by nursing station. Calm, cooperative on approach. She is discharge focused, stating that now that her brother is back from [place] and her mother is enroute to [place] from [place] she feels better because she will have the opportunity to share with them how she feels regarding. Denies si/hi/avh or paranoid ideation. Would like to be restarted on abilify. We discussed abilify more in depth in terms of [facility] formulation as an option prior to discharge. She is not open to [facility] at this time. She reports that she hasn't been adjusting well to the unit, does not find that the unit has been very therapeutic and states that now that family is back she would prefer to continue treatment outpatient. She reports having had 7-8 episodes of diarrhea with blood this morning and 3-4 episodes last night.

## 2021-12-02 NOTE — CONSULT NOTE ADULT - ATTENDING COMMENTS
Patient with H/o UC since 2018, follows with [facility], Currently on Humira, admitted to Psych.   Currently complains of 10 BM/ day, watery, intermittent blood. Reports that this is actually better, she was experiencing about 15 BM/day prior to admission. As per her Humira is no longer controlling the symptoms and her IBD specialist was planning to get drug/Ab levels to plan for possible change of therapy. She was prescribed Prednisone for the current flare, but had mood swings so did not take it. She was later prescribed Uceris, but had not picked from pharmacy, On exam mild infra-umbilical tenderness, otherwise stomach soft.   No labs, suggest to send basic labs abd F Giovany/CRP, stool labs to r/o any infection    Can start on Budesonide for now,   Patient was offered Rectal suppository - reports that she has anal fissure, none noted on exam  (done with Female GI Fellow in room)  Further management pending clinical course.
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
## 2021-12-02 NOTE — CONSULT NOTE ADULT - ASSESSMENT
Assessment:   26F with PMH of Ulcerative Colitis (first diagnosed in 2018, on Humira infusions q2 weeks, follows with Dr. Christopher Mcclellan at [facility]), bipolar disorder II, possible borderline personality disorder, and pelvic pain (unknown origin, being worked up by OB/GYN), who presented after ingestion of Abilify and Klonopin (ingested 20 and 8 pills, respectively) and was initially admitted to inpatient psych due to concern for possible suicide attempt. GI consulted given history of UC and patient reporting UC flare symptoms (diarrhea 10-12 episodes per day, blood in stool, abdominal pain) x 2 weeks.    Recommendations:   #History of Ulcerative Colitis with concern for UC flare:   First diagnosed in 2018, on Humira infusions q2 weeks, follows with Dr. Christopher Mcclellan at [facility]. Per patient, has trailed prednisone 3-4 times in the past and never been able to tolerate it due to the mode side effects. Currently complaining of abdominal pain and bloody diarrhea 10x per day. Was seen at urgent care and stool studies were sent, but patient cannot recall which studies. States she has not missed any doses of Humira. WBC 16 upon admission with no neutrophilic predominance or obvious source of infection at this time, so possibly 2/2 to steroids.   - obtain collateral from patient's GI (Dr. Mcclellan) for out-patient stool studies (out-patient CRP 12-13 per patient)   - s/p steroids and mesalamine for 3 days (patient quit both medications due to mood changes)   - send CBC, CMP, Mg, phos, CRP as has not had labs since 11/29  - send stool studies (c diff, GI PCR, stool calprotectin)   - plan to discuss if patient would benefit from CT abdomen/pelvis to further evaluate  Assessment:   26F with PMH of Ulcerative Colitis (first diagnosed in 2018, on Humira infusions q2 weeks, follows with Dr. Christopher Mcclellan at [facility]), bipolar disorder II, possible borderline personality disorder, and pelvic pain (unknown origin, being worked up by OB/GYN), who presented after ingestion of Abilify and Klonopin (ingested 20 and 8 pills, respectively) and was initially admitted to inpatient psych due to concern for possible suicide attempt. GI consulted given history of UC and patient reporting UC flare symptoms (diarrhea 10-12 episodes per day, blood in stool, abdominal pain) x 2 weeks.    Recommendations:   #History of Ulcerative Colitis with concern for UC flare:   First diagnosed in 2018, on Humira infusions q2 weeks, follows with Dr. Christopher Mcclellan at [facility]. Per patient, has trailed prednisone 3-4 times in the past and never been able to tolerate it due to the mode side effects. Currently complaining of abdominal pain and bloody diarrhea 10x per day. Was seen at urgent care and stool studies were sent, but patient cannot recall which studies. States she has not missed any doses of Humira. WBC 16 upon admission with no neutrophilic predominance or obvious source of infection at this time, so possibly 2/2 to steroids.   - obtain collateral from patient's GI (Dr. Mcclellan) for out-patient stool studies (out-patient CRP 12-13 per patient)   - s/p steroids and mesalamine for 3 days (patient quit both medications due to mood changes)   - Please start Budesonide 9 mg daily  - Send CBC, CMP, Mg, phos, CRP as has not had labs since 11/29  - Send stool studies (c diff, GI PCR, fecal calprotectin)   - Still with >10+ BMs daily however improved from last week, abdominal exam benign, can defer on abdominal imaging for now and continue to clinically monitor    GI Fellow Addendum: I have seen and examined the patient and agree with the subjective and objective information as above, as well as the assessment and plan which I have edited as needed.    Case discussed with [facility] attending and primary team.     Jamaica Moya DO  Gastroenterology Fellow  Pager: [phone]

## 2021-12-02 NOTE — BH INPATIENT PSYCHIATRY PROGRESS NOTE - NSICDXBHSECONDARYDX_PSY_ALL_CORE
Borderline personality disorder   F60.3   Borderline personality disorder   F60.3  Current severe episode of major depressive disorder without psychotic features without prior episode   F32.2

## 2021-12-03 PROCEDURE — 99233 SBSQ HOSP IP/OBS HIGH 50: CPT

## 2021-12-03 RX ORDER — BUDESONIDE, MICRONIZED 100 %
9 POWDER (GRAM) MISCELLANEOUS DAILY
Refills: 0 | Status: DISCONTINUED | OUTPATIENT
Start: 2021-12-03 | End: 2021-12-07

## 2021-12-03 RX ORDER — ARIPIPRAZOLE 15 MG/1
10 TABLET ORAL DAILY
Refills: 0 | Status: DISCONTINUED | OUTPATIENT
Start: 2021-12-03 | End: 2021-12-07

## 2021-12-03 RX ORDER — BUDESONIDE, MICRONIZED 100 %
9 POWDER (GRAM) MISCELLANEOUS DAILY
Refills: 0 | Status: DISCONTINUED | OUTPATIENT
Start: 2021-12-03 | End: 2021-12-03

## 2021-12-03 RX ADMIN — Medication 9 MILLIGRAM(S): at 19:04

## 2021-12-03 RX ADMIN — ARIPIPRAZOLE 5 MILLIGRAM(S): 15 TABLET ORAL at 09:41

## 2021-12-03 NOTE — BH INPATIENT PSYCHIATRY PROGRESS NOTE - NSBHFUPINTERVALHXFT_PSY_A_CORE
Patient initially on isolation precaution today due to r/o cdiff. Stool sample sent and lab reported that sample was too formed. She was seen throughout the day and with Attending and nurse manager due to desire for discharge and not feeling that her needs were being met during admission. Pt mother arrived from [facility] today, but due to travel outside of country unable see pt due to covid visitation health system guidance requring 2 neg pcr in 5 days. Pt very upset about that as she felt that mother came to see her in [place] and instead she is spending time with pt's brother. She is also upset with GI as she felt that her needs and concerns weren't being heard and has reported that she does not intend to take their recommended treatment. She is however amenable to abilify increase to 10mg with discharge next week once therapist and psychiatrist have been attained. Denies si/hi/avh or paranoid ideation.

## 2021-12-03 NOTE — PROGRESS NOTE ADULT - SUBJECTIVE AND OBJECTIVE BOX
GI Consult Progress Note:     OVERNIGHT EVENTS: ROLAND.     SUBJECTIVE / INTERVAL HPI:   Patient seen and examined at bedside. States that since this morning, she has had roughly 7-8 bowel movements. Feels they are less bloody than before and slightly more formed stools compared to yesterday but still soft overall. Has not yet started budesonide treatment. Denies n/v, constipation, fever, chills, SOB, or CP.       VITAL SIGNS:  Vital Signs Last 24 Hrs  T(C): 36.7 (02 Dec 2021 16:57), Max: 36.7 (02 Dec 2021 16:57)  T(F): 98.1 (02 Dec 2021 16:57), Max: 98.1 (02 Dec 2021 16:57)  HR: 105 (02 Dec 2021 16:57) (105 - 105)  BP: 115/82 (02 Dec 2021 16:57) (115/82 - 115/82)  BP(mean): --  RR: 18 (02 Dec 2021 16:57) (18 - 18)  SpO2: 99% (02 Dec 2021 16:57) (99% - 99%)        PHYSICAL EXAM:  General: Well developed; well nourished; in no acute distress  Eyes: Anicteric sclerae, moist conjunctivae  HENT: Moist mucous membranes   Neck: Trachea midline, supple  Lungs: Normal respiratory effort, no intercostal retractions, non-labored breathing   Cardiovascular: RRR, +S1. +S2  Abdomen: Soft, non-tender non-distended; Normal bowel sounds; No rebound or guarding  Rectal: external hemorrhoids, no anal fissures  Extremities: Normal range of motion, No clubbing, cyanosis or edema  Neurological: Alert and oriented x3, no focal deficits, UE and LE strength WNL   Skin: Warm and dry. No obvious rash              MEDICATIONS:  MEDICATIONS  (STANDING):  ARIPiprazole 5 milliGRAM(s) Oral daily    MEDICATIONS  (PRN):  acetaminophen     Tablet .. 650 milliGRAM(s) Oral every 6 hours PRN Moderate Pain (4 - 6)  aluminum hydroxide/magnesium hydroxide/simethicone Suspension 30 milliLiter(s) Oral every 6 hours PRN Dyspepsia  LORazepam     Tablet 2 milliGRAM(s) Oral every 6 hours PRN Symptom-triggered: each CIWA -Ar score 8 or GREATER  magnesium hydroxide Suspension 30 milliLiter(s) Oral daily PRN Constipation  melatonin. 3 milliGRAM(s) Oral at bedtime PRN Insomnia  traZODone 50 milliGRAM(s) Oral daily PRN 2nd line insomnia      ALLERGIES:  Allergies    No Known Allergies    Intolerances        LABS:                        11.9   11.79 )-----------( 449      ( 02 Dec 2021 16:45 )             39.9     12-02    140  |  103  |  15  ----------------------------<  108<H>  4.1   |  27  |  0.81    Ca    9.6      02 Dec 2021 16:45  Phos  4.1     12-02  Mg     2.0     12-02    TPro  8.5<H>  /  Alb  4.5  /  TBili  <0.2  /  DBili  x   /  AST  21  /  ALT  36  /  AlkPhos  55  12-02        CAPILLARY BLOOD GLUCOSE            RADIOLOGY & ADDITIONAL TESTS: Reviewed.    ASSESSMENT:    PLAN:    GI Consult Progress Note:     OVERNIGHT EVENTS: ROLAND.     SUBJECTIVE / INTERVAL HPI:   Patient seen and examined at bedside. States that since this morning, she has had roughly 7-8 bowel movements. Feels they are less bloody than before and slightly more formed stools compared to yesterday but still soft overall. Has not yet started budesonide treatment. Denies n/v, constipation, fever, chills, SOB, or CP.       VITAL SIGNS:  Vital Signs Last 24 Hrs  T(C): 36.7 (02 Dec 2021 16:57), Max: 36.7 (02 Dec 2021 16:57)  T(F): 98.1 (02 Dec 2021 16:57), Max: 98.1 (02 Dec 2021 16:57)  HR: 105 (02 Dec 2021 16:57) (105 - 105)  BP: 115/82 (02 Dec 2021 16:57) (115/82 - 115/82)  BP(mean): --  RR: 18 (02 Dec 2021 16:57) (18 - 18)  SpO2: 99% (02 Dec 2021 16:57) (99% - 99%)    PHYSICAL EXAM:  General: Well developed; well nourished; in no acute distress  Eyes: Anicteric sclerae, moist conjunctivae  HENT: Moist mucous membranes   Neck: Trachea midline, supple  Lungs: Normal respiratory effort, no intercostal retractions, non-labored breathing   Cardiovascular: RRR, +S1,+S2  Abdomen: Soft, non-tender non-distended; Normal bowel sounds; No rebound or guarding  Extremities: Normal range of motion, No clubbing, cyanosis or edema  Neurological: Alert and oriented x3, no focal deficits, UE and LE strength WNL   Skin: Warm and dry. No obvious rash      MEDICATIONS:  MEDICATIONS  (STANDING):  ARIPiprazole 5 milliGRAM(s) Oral daily    MEDICATIONS  (PRN):  acetaminophen     Tablet .. 650 milliGRAM(s) Oral every 6 hours PRN Moderate Pain (4 - 6)  aluminum hydroxide/magnesium hydroxide/simethicone Suspension 30 milliLiter(s) Oral every 6 hours PRN Dyspepsia  LORazepam     Tablet 2 milliGRAM(s) Oral every 6 hours PRN Symptom-triggered: each CIWA -Ar score 8 or GREATER  magnesium hydroxide Suspension 30 milliLiter(s) Oral daily PRN Constipation  melatonin. 3 milliGRAM(s) Oral at bedtime PRN Insomnia  traZODone 50 milliGRAM(s) Oral daily PRN 2nd line insomnia      ALLERGIES:  Allergies  No Known Allergies  Intolerances      LABS:                     11.9   11.79 )-----------( 449      ( 02 Dec 2021 16:45 )             39.9     12-02    140  |  103  |  15  ----------------------------<  108<H>  4.1   |  27  |  0.81    Ca    9.6      02 Dec 2021 16:45  Phos  4.1     12-02  Mg     2.0     12-02    TPro  8.5<H>  /  Alb  4.5  /  TBili  <0.2  /  DBili  x   /  AST  21  /  ALT  36  /  AlkPhos  55  12-02        CAPILLARY BLOOD GLUCOSE  RADIOLOGY & ADDITIONAL TESTS: Reviewed.

## 2021-12-03 NOTE — BH CHART NOTE - NSEVENTNOTEFT_PSY_ALL_CORE
PHYSICAL EXAM: Agree/Declined    VITALS: T(C): 36.7 (12-03-21 @ 16:49), Max: 36.7 (12-03-21 @ 16:49)  HR: 92 (12-03-21 @ 16:49) (83 - 92)  BP: 118/78 (12-03-21 @ 16:49) (105/71 - 118/78)  RR: 18 (12-03-21 @ 16:49) (18 - 18)  SpO2: 99% (12-03-21 @ 16:49) (99% - 99%)      GENERAL: NAD, comfortable, ambulating  HEAD:  Atraumatic, Normocephalic  EYES: EOMI, PERRLA, conjunctiva and sclera clear  ENT: Moist mucous membranes  NECK: Supple, No JVD  CHEST/LUNG: Clear to auscultation bilaterally; No rales, rhonchi, wheezing, or rubs. Unlabored respirations  HEART: Regular rate and rhythm; No murmurs, rubs, or gallops  ABDOMEN: BSx4; Soft, nontender, nondistended  EXTREMITIES:  No clubbing, cyanosis, or edema  NERVOUS SYSTEM:  A&Ox3, no focal deficits   SKIN: No rashes or lesions
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
## 2021-12-03 NOTE — PROGRESS NOTE ADULT - ASSESSMENT
Assessment:   26F with PMH of Ulcerative Colitis (first diagnosed in 2018, on Humira infusions q2 weeks, follows with Dr. Christopher Mcclellan at [facility]), bipolar disorder II, possible borderline personality disorder, and pelvic pain (unknown origin, being worked up by OB/GYN), who presented after ingestion of Abilify and Klonopin. Initially admitted to inpatient psych due to concern for possible suicide attempt. GI consulted for possible UC flare (diarrhea 10-12 episodes per day, blood in stool, abdominal pain) x 2 weeks. Now started on budesonide.     Recommendations:   #History of Ulcerative Colitis with concern for UC flare:   First diagnosed in 2018, on Humira infusions q2 weeks, follows with Dr. Christopher Mcclellan at [facility]. Per patient, has trialed prednisone 3-4 times in the past and never been able to tolerate it due to the mood side effects. Currently complaining of abdominal pain and bloody diarrhea 10x per day. Was seen at urgent care and stool studies were sent, but patient cannot recall which studies. States she has not missed any doses of Humira. WBC 16 upon admission with no neutrophilic predominance or obvious source of infection at this time, so possibly 2/2 to steroids.   - continue start Budesonide 9 mg PO daily  - f/u stool studies sent on 12/03 (c diff, GI PCR, fecal calprotectin)   - CRP 16.5 this admission, Hb stable at 11.9, and WBC now down-trending (16.61 to 11.79)   - obtain collateral from patient's GI (Dr. Mcclellan) for out-patient stool studies (out-patient CRP 12-13 per patient)   - still with >10+ BMs daily however improved from last week (stool now more formed with less blood), abdominal exam benign, so can defer on abdominal imaging for now and continue to clinically monitor       Assessment:   26F with PMH of Ulcerative Colitis (first diagnosed in 2018, on Humira infusions q2 weeks, follows with Dr. Christopher Mcclellan at [facility]), bipolar disorder II, possible borderline personality disorder, and pelvic pain (unknown origin, being worked up by OB/GYN), who presented after ingestion of Abilify and Klonopin. Initially admitted to inpatient psych due to concern for possible suicide attempt. GI consulted for possible UC flare (diarrhea 10-12 episodes per day, blood in stool, abdominal pain) x 2 weeks. Now started on budesonide.     Recommendations:   #History of Ulcerative Colitis with concern for UC flare:   First diagnosed in 2018, on Humira infusions q2 weeks, follows with Dr. Christopher Mcclellan at [facility]. Per patient, has trialed prednisone 3-4 times in the past and never been able to tolerate it due to the mood side effects. Currently complaining of abdominal pain and bloody diarrhea 10x per day. Was seen at urgent care and stool studies were sent, but patient cannot recall which studies. States she has not missed any doses of Humira. WBC 16 upon admission with no neutrophilic predominance or obvious source of infection at this time, so possibly 2/2 to steroids.   - continue start Budesonide 9 mg PO daily  - f/u stool studies sent on 12/03 (c diff, GI PCR, fecal calprotectin)   - CRP 16.5 this admission, Hb stable at 11.9, and WBC now down-trending (16.61 to 11.79)   - obtain collateral from patient's GI (Dr. Mcclellan) for out-patient stool studies (out-patient CRP 12-13 per patient)   - still with >10+ BMs daily however improved from last week (stool now more formed with less blood), abdominal exam benign, so can defer on abdominal imaging for now and continue to clinically monitor    GI Fellow Addendum: I have seen and examined the patient and agree with the subjective and objective information as above, as well as the assessment and plan which I have edited as needed.    Case discussed with [facility] attending and primary team.     Jamaica Moya DO  Gastroenterology Fellow  Pager: [phone]

## 2021-12-03 NOTE — BH INPATIENT PSYCHIATRY PROGRESS NOTE - NSICDXBHSECONDARYDX_PSY_ALL_CORE
Borderline personality disorder   F60.3  Current severe episode of major depressive disorder without psychotic features without prior episode   F32.2

## 2021-12-04 PROCEDURE — 99232 SBSQ HOSP IP/OBS MODERATE 35: CPT

## 2021-12-04 RX ORDER — LANOLIN ALCOHOL/MO/W.PET/CERES
5 CREAM (GRAM) TOPICAL AT BEDTIME
Refills: 0 | Status: DISCONTINUED | OUTPATIENT
Start: 2021-12-04 | End: 2021-12-07

## 2021-12-04 RX ADMIN — Medication 9 MILLIGRAM(S): at 10:13

## 2021-12-04 RX ADMIN — ARIPIPRAZOLE 10 MILLIGRAM(S): 15 TABLET ORAL at 10:14
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
## 2021-12-04 NOTE — BH INPATIENT PSYCHIATRY PROGRESS NOTE - NSBHFUPINTERVALHXFT_PSY_A_CORE
Patient seen individually, case discussed with nursing and pt's GI attending.  ROLAND o/n. Pt reported feeling somewhat tired due to the increase in the abilify dose but endorsed no other side effects. She continues to have 6-8 BMs a day (per chart, c diff sample was rejected due to being too formed). No other complaints or concerns, denies SI/Hi/AVH and reports feeling overall better. Is optimistic and discharge focused. Sleep is improving, appetite is fair.

## 2021-12-05 PROCEDURE — 99232 SBSQ HOSP IP/OBS MODERATE 35: CPT

## 2021-12-05 RX ADMIN — ARIPIPRAZOLE 10 MILLIGRAM(S): 15 TABLET ORAL at 09:55

## 2021-12-05 RX ADMIN — Medication 9 MILLIGRAM(S): at 09:55

## 2021-12-05 RX ADMIN — Medication 5 MILLIGRAM(S): at 22:09

## 2021-12-05 NOTE — PROGRESS NOTE ADULT - SUBJECTIVE AND OBJECTIVE BOX
GASTROENTEROLOGY PROGRESS NOTE  Patient seen and examined at bedside.  BM less frequent, now 5-6x/ day and less bloody. Still having urgency.    PERTINENT REVIEW OF SYSTEMS:  CONSTITUTIONAL: No weakness, fevers or chills  HEENT: No visual changes; No vertigo or throat pain   GASTROINTESTINAL: As above.  NEUROLOGICAL: No numbness or weakness  SKIN: No itching, burning, rashes, or lesions     Allergies    No Known Allergies    Intolerances      MEDICATIONS:  MEDICATIONS  (STANDING):  ARIPiprazole 10 milliGRAM(s) Oral daily  buDESOnide    EC Capsule 9 milliGRAM(s) Oral daily    MEDICATIONS  (PRN):  acetaminophen     Tablet .. 650 milliGRAM(s) Oral every 6 hours PRN Moderate Pain (4 - 6)  aluminum hydroxide/magnesium hydroxide/simethicone Suspension 30 milliLiter(s) Oral every 6 hours PRN Dyspepsia  LORazepam     Tablet 2 milliGRAM(s) Oral every 6 hours PRN Symptom-triggered: each CIWA -Ar score 8 or GREATER  magnesium hydroxide Suspension 30 milliLiter(s) Oral daily PRN Constipation  melatonin. 5 milliGRAM(s) Oral at bedtime PRN Insomnia  traZODone 50 milliGRAM(s) Oral daily PRN 2nd line insomnia    Vital Signs Last 24 Hrs  T(C): 36.9 (05 Dec 2021 09:46), Max: 37.1 (04 Dec 2021 17:28)  T(F): 98.4 (05 Dec 2021 09:46), Max: 98.7 (04 Dec 2021 17:28)  HR: 86 (05 Dec 2021 09:46) (86 - 99)  BP: 111/78 (05 Dec 2021 09:46) (111/78 - 122/84)  BP(mean): --  RR: 18 (05 Dec 2021 09:46) (18 - 18)  SpO2: 98% (05 Dec 2021 09:46) (98% - 98%)    PHYSICAL EXAM:    General: in no acute distress  HEENT: MMM, conjunctiva and sclera clear  Gastrointestinal: Soft non-tender non-distended; No rebound or guarding  Skin: Warm and dry. No obvious rash    LABS:                            RADIOLOGY & ADDITIONAL STUDIES:  Reviewed

## 2021-12-05 NOTE — PROGRESS NOTE ADULT - ASSESSMENT
Assessment:   26F with PMH of Ulcerative Colitis (first diagnosed in 2018, on Humira infusions q2 weeks, follows with Dr. Christopher Mcclellan at [facility]), bipolar disorder II, possible borderline personality disorder, and pelvic pain (unknown origin, being worked up by OB/GYN), who presented after ingestion of Abilify and Klonopin. Initially admitted to inpatient psych due to concern for possible suicide attempt. GI consulted for possible UC flare (diarrhea 10-12 episodes per day, blood in stool, abdominal pain) x 2 weeks. Now started on budesonide.     Recommendations:   #History of Ulcerative Colitis with concern for UC flare:   First diagnosed in 2018, on Humira infusions q2 weeks, follows with Dr. Christopher Mcclellan at [facility]. Per patient, has trialed prednisone 3-4 times in the past and never been able to tolerate it due to the mood side effects. Currently complaining of abdominal pain and bloody diarrhea 10x per day. Was seen at urgent care and stool studies were sent, but patient cannot recall which studies. States she has not missed any doses of Humira. WBC 16 upon admission with no neutrophilic predominance or obvious source of infection at this time, so possibly 2/2 to steroids.   - continue start Budesonide 9 mg PO daily  - f/u stool studies sent on 12/03 (c diff, GI PCR, fecal calprotectin)   - CRP 16.5 this admission, Hb stable at 11.9, and WBC now down-trending (16.61 to 11.79)   - obtain collateral from patient's GI (Dr. Mcclellan) for out-patient stool studies (out-patient CRP 12-13 per patient)   - BMs now 5-6x /day from >10+ BMs daily, abdominal exam benign, so can defer on abdominal imaging for now and continue to clinically monitor  - Repeat CBC, CMP, CRP, and stool studies today  - Mesalamine suppository/ or enema before bedtime    Case discussed with service attending

## 2021-12-06 PROBLEM — F32.A DEPRESSION, UNSPECIFIED: Chronic | Status: ACTIVE | Noted: 2021-11-29

## 2021-12-06 PROBLEM — K51.90 ULCERATIVE COLITIS, UNSPECIFIED, WITHOUT COMPLICATIONS: Chronic | Status: ACTIVE | Noted: 2021-11-29

## 2021-12-06 LAB
ALBUMIN SERPL ELPH-MCNC: 4.3 G/DL — SIGNIFICANT CHANGE UP (ref 3.3–5)
ALP SERPL-CCNC: 54 U/L — SIGNIFICANT CHANGE UP (ref 40–120)
ALT FLD-CCNC: 30 U/L — SIGNIFICANT CHANGE UP (ref 10–45)
ANION GAP SERPL CALC-SCNC: 9 MMOL/L — SIGNIFICANT CHANGE UP (ref 5–17)
AST SERPL-CCNC: 24 U/L — SIGNIFICANT CHANGE UP (ref 10–40)
BILIRUB SERPL-MCNC: 0.2 MG/DL — SIGNIFICANT CHANGE UP (ref 0.2–1.2)
BUN SERPL-MCNC: 10 MG/DL — SIGNIFICANT CHANGE UP (ref 7–23)
C DIFF GDH STL QL: NEGATIVE — SIGNIFICANT CHANGE UP
C DIFF GDH STL QL: SIGNIFICANT CHANGE UP
CALCIUM SERPL-MCNC: 9.4 MG/DL — SIGNIFICANT CHANGE UP (ref 8.4–10.5)
CHLORIDE SERPL-SCNC: 106 MMOL/L — SIGNIFICANT CHANGE UP (ref 96–108)
CO2 SERPL-SCNC: 26 MMOL/L — SIGNIFICANT CHANGE UP (ref 22–31)
CREAT SERPL-MCNC: 0.7 MG/DL — SIGNIFICANT CHANGE UP (ref 0.5–1.3)
CULTURE RESULTS: SIGNIFICANT CHANGE UP
GLUCOSE SERPL-MCNC: 119 MG/DL — HIGH (ref 70–99)
HCT VFR BLD CALC: 38.5 % — SIGNIFICANT CHANGE UP (ref 34.5–45)
HGB BLD-MCNC: 11.6 G/DL — SIGNIFICANT CHANGE UP (ref 11.5–15.5)
MCHC RBC-ENTMCNC: 25.2 PG — LOW (ref 27–34)
MCHC RBC-ENTMCNC: 30.1 GM/DL — LOW (ref 32–36)
MCV RBC AUTO: 83.5 FL — SIGNIFICANT CHANGE UP (ref 80–100)
NRBC # BLD: 0 /100 WBCS — SIGNIFICANT CHANGE UP (ref 0–0)
PLATELET # BLD AUTO: 445 K/UL — HIGH (ref 150–400)
POTASSIUM SERPL-MCNC: 4 MMOL/L — SIGNIFICANT CHANGE UP (ref 3.5–5.3)
POTASSIUM SERPL-SCNC: 4 MMOL/L — SIGNIFICANT CHANGE UP (ref 3.5–5.3)
PROT SERPL-MCNC: 8.2 G/DL — SIGNIFICANT CHANGE UP (ref 6–8.3)
RBC # BLD: 4.61 M/UL — SIGNIFICANT CHANGE UP (ref 3.8–5.2)
RBC # FLD: 14.6 % — HIGH (ref 10.3–14.5)
SODIUM SERPL-SCNC: 141 MMOL/L — SIGNIFICANT CHANGE UP (ref 135–145)
SPECIMEN SOURCE: SIGNIFICANT CHANGE UP
WBC # BLD: 9.86 K/UL — SIGNIFICANT CHANGE UP (ref 3.8–10.5)
WBC # FLD AUTO: 9.86 K/UL — SIGNIFICANT CHANGE UP (ref 3.8–10.5)

## 2021-12-06 PROCEDURE — 99232 SBSQ HOSP IP/OBS MODERATE 35: CPT

## 2021-12-06 PROCEDURE — 90834 PSYTX W PT 45 MINUTES: CPT

## 2021-12-06 PROCEDURE — 99233 SBSQ HOSP IP/OBS HIGH 50: CPT

## 2021-12-06 RX ORDER — MESALAMINE 400 MG
4 TABLET, DELAYED RELEASE (ENTERIC COATED) ORAL AT BEDTIME
Refills: 0 | Status: DISCONTINUED | OUTPATIENT
Start: 2021-12-06 | End: 2021-12-07

## 2021-12-06 RX ADMIN — Medication 9 MILLIGRAM(S): at 10:05

## 2021-12-06 RX ADMIN — ARIPIPRAZOLE 10 MILLIGRAM(S): 15 TABLET ORAL at 10:05

## 2021-12-06 NOTE — PROGRESS NOTE ADULT - ATTENDING COMMENTS
Started on Budesonide, reports some improvement in Bowel frequency, no abdominal pain reported. Complains of rectal urgency. Discussed rectal mesalamine suppository and enema with the patient. She is willing to try the enema and if can't will use the suppository. Rest plan as per fellow note.
No longer having blood in bowel movements, still complains of rectal urgency, planned to start on topical mesalamine therapy - suppository or enema as per patient tolerance. Patient offered outpatient follow up with our IBD team, says she will think about it. Continue Budesonide.

## 2021-12-06 NOTE — PROGRESS NOTE ADULT - ASSESSMENT
Assessment:   26F with PMH of Ulcerative Colitis (first diagnosed in 2018, on Humira infusions q2 weeks, follows with Dr. Christopher Mcclellan at [facility]), bipolar disorder II, possible borderline personality disorder, and pelvic pain (unknown origin, being worked up by OB/GYN), who presented after ingestion of Abilify and Klonopin. Initially admitted to inpatient psych due to concern for possible suicide attempt. GI consulted for possible UC flare (diarrhea 10-12 episodes per day, blood in stool, abdominal pain) x 2 weeks. Now started on budesonide with some symptom improvement.     ***INCOMPLETE NOTE - recommendations to be discussed with attending   Recommendations:   #History of Ulcerative Colitis with concern for UC flare:   First diagnosed in 2018, on Humira infusions q2 weeks, follows with Dr. Christopher Mcclellan at [facility]. Per patient, has trialed prednisone 3-4 times in the past and never been able to tolerate it due to the mood side effects. Currently complaining of frequent bowel movements (10x per day), urgency, and occasional bloody stools. WBC 16 upon admission with no neutrophilic predominance or obvious source of infection at this time, so possibly 2/2 to steroids. Started on Budesonide on 12/03 and had tolerated it well without any mood side effects. CRP 16.5 this admission.   - continue start Budesonide 9 mg PO daily  - send CBC with diff, CMP, CRP today (last labs from 12/02)   - f/u stool calprotectin (pending in lab); re-send GI PCR and c diff (initial samples rejected)     - if patient is amenable, try mesalamine suppository or enema before bedtime   - obtain collateral from patient's GI (Dr. Mcclellan) about prior work-up and Humira treatments (out-patient was due for next infusion on 12/09)   - per patient, still with 10 BMs per day, but abdominal exam benign and with less blood in stool, so can defer further abdominal imaging at this time    Assessment:   26F with PMH of Ulcerative Colitis (first diagnosed in 2018, on Humira infusions q2 weeks, follows with Dr. Christopher Mcclellan at [facility]), bipolar disorder II, possible borderline personality disorder, and pelvic pain (unknown origin, being worked up by OB/GYN), who presented after ingestion of Abilify and Klonopin. Initially admitted to inpatient psych due to concern for possible suicide attempt. GI consulted for possible UC flare (diarrhea 10-12 episodes per day, blood in stool, abdominal pain) x 2 weeks. Now started on budesonide with some symptom improvement.     Recommendations:   #History of Ulcerative Colitis with concern for UC flare:   First diagnosed in 2018, on Humira infusions q2 weeks, follows with Dr. Christopher Mcclellan at [facility]. Per patient, has trialed prednisone 3-4 times in the past and never been able to tolerate it due to the mood side effects. Currently complaining of frequent bowel movements (10x per day), urgency, and occasional bloody stools. WBC 16 upon admission with no neutrophilic predominance or obvious source of infection at this time, so possibly 2/2 to steroids. Started on Budesonide on 12/03 and had tolerated it well without any mood side effects. CRP 16.5 this admission.   - continue with Budesonide 9 mg PO qHS  - Please start Mesalamine enema 4 mg daily starting today, 12/6  - send CBC with diff, CMP, CRP today (last labs from 12/02)   - f/u stool calprotectin (pending in lab); re-send GI PCR and c diff (initial samples rejected)     - obtain collateral from patient's GI (Dr. Mcclellan) about prior work-up and Humira treatments (out-patient was due for next infusion on 12/09)   - per patient, still with 10 BMs per day, but abdominal exam benign and with less blood in stool, so can defer further abdominal imaging at this time     GI Fellow Addendum: I have seen and examined the patient and agree with the subjective and objective information as above, as well as the assessment and plan which I have edited as needed.    Case discussed with [facility] attending and primary team.     Jamaica Moya DO  Gastroenterology Fellow  Pager: [phone]

## 2021-12-06 NOTE — BH INPATIENT PSYCHIATRY PROGRESS NOTE - NSBHFUPINTERVALCCFT_PSY_A_CORE
"I am okay" 
"I am okay" 
I don't think this environment is therapeutic for me
I don't think this environment is therapeutic for me

## 2021-12-06 NOTE — BH INPATIENT PSYCHIATRY PROGRESS NOTE - NSICDXBHPRIMARYDX_PSY_ALL_CORE
Bipolar II disorder   F31.81  
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
## 2021-12-06 NOTE — PROGRESS NOTE ADULT - SUBJECTIVE AND OBJECTIVE BOX
Incomplete GI Consult Progress Note:     OVERNIGHT EVENTS:    SUBJECTIVE / INTERVAL HPI: Patient seen and examined at bedside.     VITAL SIGNS:  Vital Signs Last 24 Hrs  T(C): 36.8 (05 Dec 2021 16:41), Max: 36.9 (05 Dec 2021 09:46)  T(F): 98.3 (05 Dec 2021 16:41), Max: 98.4 (05 Dec 2021 09:46)  HR: 93 (05 Dec 2021 16:41) (86 - 93)  BP: 123/80 (05 Dec 2021 16:41) (111/78 - 123/80)  BP(mean): --  RR: 19 (05 Dec 2021 16:41) (18 - 19)  SpO2: 99% (05 Dec 2021 16:41) (98% - 99%)      PHYSICAL EXAM:    General: WDWN  HEENT: NC/AT; PERRL, anicteric sclera; MMM  Neck: supple  Cardiovascular: +S1/S2; RRR  Respiratory: CTA B/L; no W/R/R  Gastrointestinal: soft, NT/ND; +BSx4  Extremities: WWP; no edema, clubbing or cyanosis  Vascular: 2+ radial, DP/PT pulses B/L  Neurological: AAOx3; no focal deficits    MEDICATIONS:  MEDICATIONS  (STANDING):  ARIPiprazole 10 milliGRAM(s) Oral daily  buDESOnide    EC Capsule 9 milliGRAM(s) Oral daily    MEDICATIONS  (PRN):  acetaminophen     Tablet .. 650 milliGRAM(s) Oral every 6 hours PRN Moderate Pain (4 - 6)  aluminum hydroxide/magnesium hydroxide/simethicone Suspension 30 milliLiter(s) Oral every 6 hours PRN Dyspepsia  LORazepam     Tablet 2 milliGRAM(s) Oral every 6 hours PRN Symptom-triggered: each CIWA -Ar score 8 or GREATER  magnesium hydroxide Suspension 30 milliLiter(s) Oral daily PRN Constipation  melatonin. 5 milliGRAM(s) Oral at bedtime PRN Insomnia  traZODone 50 milliGRAM(s) Oral daily PRN 2nd line insomnia      ALLERGIES:  Allergies    No Known Allergies    Intolerances        LABS:              CAPILLARY BLOOD GLUCOSE            RADIOLOGY & ADDITIONAL TESTS: Reviewed.    ASSESSMENT:    PLAN:    GI Consult Progress Note:     OVERNIGHT EVENTS: ROLAND.     SUBJECTIVE / INTERVAL HPI:   Patient seen and examined at bedside. States that overall she had much less blood in her bowel movements, no only every few bowel movements, but still feels that she is experiencing increased frequency and urgency with roughly 10 bowel movements per day. Says that her most bothersome symptom is the urgency because she cannot do any activity that requires being far from the bathroom at this time. Is on her third day of budesonide and has not yet tried the mesalamine enema because is concerned her hemorrhoids will make an enema uncomfortable but is willing to try it. Is due for her normal Humira infusion this Thursday, so if not discharged would like for that to be arranged inpatient. Otherwise Patient denies nausea, vomiting, diarrhea, constipation, fever, chills, night sweats, shortness of breath, cough, wheezing, chest pain, dysuria, or increased urinary frequency.      VITAL SIGNS:  Vital Signs Last 24 Hrs  T(C): 36.8 (05 Dec 2021 16:41), Max: 36.9 (05 Dec 2021 09:46)  T(F): 98.3 (05 Dec 2021 16:41), Max: 98.4 (05 Dec 2021 09:46)  HR: 93 (05 Dec 2021 16:41) (86 - 93)  BP: 123/80 (05 Dec 2021 16:41) (111/78 - 123/80)  BP(mean): --  RR: 19 (05 Dec 2021 16:41) (18 - 19)  SpO2: 99% (05 Dec 2021 16:41) (98% - 99%)      PHYSICAL EXAM:  General: Well developed; well nourished; in no acute distress  Eyes: Anicteric sclerae, moist conjunctivae  HENT: Moist mucous membranes   Neck: Trachea midline, supple  Lungs: Normal respiratory effort, no intercostal retractions, non-labored breathing   Cardiovascular: RRR, +S1,+S2  Abdomen: Soft, largely non-tender non-distended; intermittent tenderness in suprapubic region; normal bowel sounds; No rebound or guarding  Extremities: Normal range of motion, No clubbing, cyanosis or edema  Neurological: Alert and oriented x3, no focal deficits, UE and LE strength WNL   Skin: Warm and dry. No obvious rash      MEDICATIONS:  MEDICATIONS  (STANDING):  ARIPiprazole 10 milliGRAM(s) Oral daily  buDESOnide    EC Capsule 9 milliGRAM(s) Oral daily    MEDICATIONS  (PRN):  acetaminophen     Tablet .. 650 milliGRAM(s) Oral every 6 hours PRN Moderate Pain (4 - 6)  aluminum hydroxide/magnesium hydroxide/simethicone Suspension 30 milliLiter(s) Oral every 6 hours PRN Dyspepsia  LORazepam     Tablet 2 milliGRAM(s) Oral every 6 hours PRN Symptom-triggered: each CIWA -Ar score 8 or GREATER  magnesium hydroxide Suspension 30 milliLiter(s) Oral daily PRN Constipation  melatonin. 5 milliGRAM(s) Oral at bedtime PRN Insomnia  traZODone 50 milliGRAM(s) Oral daily PRN 2nd line insomnia      ALLERGIES:  Allergies  No Known Allergies  Intolerances    LABS:  Pending       CAPILLARY BLOOD GLUCOSE  RADIOLOGY & ADDITIONAL TESTS: Reviewed.

## 2021-12-06 NOTE — BH INPATIENT PSYCHIATRY PROGRESS NOTE - TELEPSYCHIATRY?
"Chief Complaint   Patient presents with     Urgent Care     Cough     x 1 month, sinus pain, chest congestion.  Pt was seen in UC, not better.       Initial BP (!) 154/108 (BP Location: Right arm, Patient Position: Chair, Cuff Size: Adult Regular)  Pulse 99  Temp 99.6  F (37.6  C) (Oral)  Resp 16  LMP  (LMP Unknown)  SpO2 100% Estimated body mass index is 31.76 kg/(m^2) as calculated from the following:    Height as of 2/12/17: 5' 4\" (1.626 m).    Weight as of 2/12/17: 185 lb (83.9 kg).  Medication Reconciliation: complete      Urszula Green CMA                                2/26/2017 6:28 PM     "
No

## 2021-12-06 NOTE — BH DISCHARGE NOTE NURSING/SOCIAL WORK/PSYCH REHAB - PATIENT PORTAL LINK FT
You can access the FollowMyHealth Patient Portal offered by [facility] by registering at the following website: [email or web address]. By joining [employer]’s FollowMyHealth portal, you will also be able to view your health information using other applications (apps) compatible with our system.

## 2021-12-06 NOTE — BH INPATIENT PSYCHIATRY PROGRESS NOTE - NSBHATTESTSEENBY_PSY_A_CORE
NP without Attending Psychiatrist
NP without Attending Psychiatrist
attending Psychiatrist without NP/Trainee
NP without Attending Psychiatrist

## 2021-12-06 NOTE — BH INPATIENT PSYCHIATRY PROGRESS NOTE - NSBHCHARTREVIEWVS_PSY_A_CORE FT
Vital Signs Last 24 Hrs  T(C): 36.7 (12-03-21 @ 16:49), Max: 36.7 (12-03-21 @ 16:49)  T(F): 98 (12-03-21 @ 16:49), Max: 98 (12-03-21 @ 16:49)  HR: 92 (12-03-21 @ 16:49) (92 - 92)  BP: 118/78 (12-03-21 @ 16:49) (118/78 - 118/78)  BP(mean): --  RR: 18 (12-03-21 @ 16:49) (18 - 18)  SpO2: 99% (12-03-21 @ 16:49) (99% - 99%)    
Vital Signs Last 24 Hrs  T(C): 36.5 (12-02-21 @ 08:30), Max: 36.8 (12-01-21 @ 16:30)  T(F): 97.7 (12-02-21 @ 08:30), Max: 98.2 (12-01-21 @ 16:30)  HR: 114 (12-02-21 @ 08:30) (101 - 114)  BP: 97/65 (12-02-21 @ 08:30) (97/65 - 115/77)  BP(mean): --  RR: 18 (12-02-21 @ 08:30) (18 - 18)  SpO2: 98% (12-02-21 @ 08:30) (98% - 99%)    
Vital Signs Last 24 Hrs  T(C): 36.7 (12-03-21 @ 16:49), Max: 36.7 (12-03-21 @ 16:49)  T(F): 98 (12-03-21 @ 16:49), Max: 98 (12-03-21 @ 16:49)  HR: 92 (12-03-21 @ 16:49) (83 - 92)  BP: 118/78 (12-03-21 @ 16:49) (105/71 - 118/78)  BP(mean): --  RR: 18 (12-03-21 @ 16:49) (18 - 18)  SpO2: 99% (12-03-21 @ 16:49) (99% - 99%)    
Vital Signs Last 24 Hrs  T(C): 36.6 (12-06-21 @ 09:00), Max: 36.8 (12-05-21 @ 16:41)  T(F): 97.9 (12-06-21 @ 09:00), Max: 98.3 (12-05-21 @ 16:41)  HR: 87 (12-06-21 @ 09:00) (87 - 93)  BP: 108/66 (12-06-21 @ 09:00) (108/66 - 123/80)  BP(mean): --  RR: 16 (12-06-21 @ 09:00) (16 - 19)  SpO2: 98% (12-06-21 @ 09:00) (98% - 99%)

## 2021-12-06 NOTE — BH INPATIENT PSYCHIATRY PROGRESS NOTE - NSTXSUICIDGOAL_PSY_ALL_CORE
Be able to state 3 reasons for living
Talk to staff and ask for assistance when having suicidal wishes instead of acting out

## 2021-12-06 NOTE — BH DISCHARGE NOTE NURSING/SOCIAL WORK/PSYCH REHAB - NSCDUDCCRISIS_PSY_A_CORE
[facility] Well  1 [phone]) [facility]-WELL [phone])  Text "WELL" to 10934  Website: www.[employer]/.[facility]iz[facility] 1 (902) 031-UCDI (7605) Website: www.safehorizon.org/.National Suicide Prevention Lifeline 9 (156) 792-2811/.  [facility]  1 (385) LIFENET (241-5075)/.  [facility]  [address] [place] [address] [phone]   Hours:  Monday through Friday from 9 AM to 3 PM/.  [place] [facility] of  Affairs - Veterans Crisis Line  3 [phone] [phone], Option 1 [facility]  1 (692) [facility]-WELL ([phone])  Text "WELL" to 52993  Website: www.[employer]/.[facility]izons 1 (252) 591-RKSH (0619) Website: www.safehorizon.org/.National Suicide Prevention Lifeline 6 (834) 131-4318/.  [facility]  1 (308) LIFENET (686-6067)/.  [facility]’s Behavioral Health Crisis Center  [address] [place] [address] [phone]   Hours:  Monday through Friday from 9 AM to 3 PM [facility] Well  1 (594) [facility]-WELL ([phone])  Text "WELL" to 40065  Website: www.[employer]/.[facility]izons 1 (514) 501-XBIU (1018) Website: www.safehorizon.org/.National Suicide Prevention Lifeline 1 (833) 997-1752/.  [facility]  1 (580) LIFENET (361-7006)/.  [facility]’s Behavioral [facility] Crisis [facility]  [address] [place] [address] [phone]   Hours:  Monday through Friday from 9 AM to 3 PM/.  [facility].[place] Dept of  Affairs - Veterans Crisis Line  [phone], Option 1

## 2021-12-06 NOTE — BH INPATIENT PSYCHIATRY PROGRESS NOTE - NSBHINPTBILLING_PSY_ALL_CORE
[id number] - Inpatient High Complexity
63768 - Inpatient High Complexity
[id number] - Inpatient High Complexity
91699 - Inpatient High Complexity

## 2021-12-06 NOTE — BH DISCHARGE NOTE NURSING/SOCIAL WORK/PSYCH REHAB - NSDCSUICIDEPREP_PSY_ALL_CORE
No patient is future focused, denies SI, HI, AH or VH mood is good and affect calm, discharge instructions explained and patient verbalized understanding/No

## 2021-12-06 NOTE — BH INPATIENT PSYCHIATRY PROGRESS NOTE - NSBHMETABOLIC_PSY_ALL_CORE_FT
BMI: BMI (kg/m2): 30.7 (11-29-21 @ 19:06)  HbA1c: A1C with Estimated Average Glucose Result: 5.6 % (12-02-21 @ 09:31)    Glucose: POCT Blood Glucose.: 99 mg/dL (11-29-21 @ 19:31)    BP: 97/65 (12-02-21 @ 08:30) (97/65 - 115/77)  Lipid Panel: Date/Time: 12-02-21 @ 09:31  Cholesterol, Serum: 214  Direct LDL: --  HDL Cholesterol, Serum: 53  Total Cholesterol/HDL Ration Measurement: --  Triglycerides, Serum: 157  
BMI: BMI (kg/m2): 30.7 (11-29-21 @ 19:06)  HbA1c: A1C with Estimated Average Glucose Result: 5.6 % (12-02-21 @ 09:31)    Glucose: POCT Blood Glucose.: 99 mg/dL (11-29-21 @ 19:31)    BP: 108/66 (12-06-21 @ 09:00) (108/66 - 123/80)  Lipid Panel: Date/Time: 12-02-21 @ 09:31  Cholesterol, Serum: 214  Direct LDL: --  HDL Cholesterol, Serum: 53  Total Cholesterol/HDL Ration Measurement: --  Triglycerides, Serum: 157  
BMI: BMI (kg/m2): 30.7 (11-29-21 @ 19:06)  HbA1c: A1C with Estimated Average Glucose Result: 5.6 % (12-02-21 @ 09:31)    Glucose: POCT Blood Glucose.: 99 mg/dL (11-29-21 @ 19:31)    BP: 118/78 (12-03-21 @ 16:49) (97/65 - 118/78)  Lipid Panel: Date/Time: 12-02-21 @ 09:31  Cholesterol, Serum: 214  Direct LDL: --  HDL Cholesterol, Serum: 53  Total Cholesterol/HDL Ration Measurement: --  Triglycerides, Serum: 157  
BMI: BMI (kg/m2): 30.7 (11-29-21 @ 19:06)  HbA1c: A1C with Estimated Average Glucose Result: 5.6 % (12-02-21 @ 09:31)    Glucose: POCT Blood Glucose.: 99 mg/dL (11-29-21 @ 19:31)    BP: 118/78 (12-03-21 @ 16:49) (97/65 - 118/78)  Lipid Panel: Date/Time: 12-02-21 @ 09:31  Cholesterol, Serum: 214  Direct LDL: --  HDL Cholesterol, Serum: 53  Total Cholesterol/HDL Ration Measurement: --  Triglycerides, Serum: 157

## 2021-12-06 NOTE — BH DISCHARGE NOTE NURSING/SOCIAL WORK/PSYCH REHAB - NSDCPRGOAL_PSY_ALL_CORE
Pt. has attended select groups during her admission and has participated appropriately when in attendance.  Pt. was initially very quiet and presented with a flat affect in groups, but has been more expressive and interactive as her admission progressed.  Pt. has been social with select peers.  Pt. has been pleasant on approach.  Pt. has endorsed readiness for discharge and completed a safety plan.

## 2021-12-06 NOTE — BH INPATIENT PSYCHIATRY PROGRESS NOTE - CURRENT MEDICATION
MEDICATIONS  (STANDING):  ARIPiprazole 5 milliGRAM(s) Oral daily    MEDICATIONS  (PRN):  acetaminophen     Tablet .. 650 milliGRAM(s) Oral every 6 hours PRN Moderate Pain (4 - 6)  aluminum hydroxide/magnesium hydroxide/simethicone Suspension 30 milliLiter(s) Oral every 6 hours PRN Dyspepsia  LORazepam     Tablet 2 milliGRAM(s) Oral every 6 hours PRN Symptom-triggered: each CIWA -Ar score 8 or GREATER  magnesium hydroxide Suspension 30 milliLiter(s) Oral daily PRN Constipation  melatonin. 3 milliGRAM(s) Oral at bedtime PRN Insomnia  traZODone 50 milliGRAM(s) Oral daily PRN 2nd line insomnia  
MEDICATIONS  (STANDING):  ARIPiprazole 10 milliGRAM(s) Oral daily  buDESOnide    EC Capsule 9 milliGRAM(s) Oral daily    MEDICATIONS  (PRN):  acetaminophen     Tablet .. 650 milliGRAM(s) Oral every 6 hours PRN Moderate Pain (4 - 6)  aluminum hydroxide/magnesium hydroxide/simethicone Suspension 30 milliLiter(s) Oral every 6 hours PRN Dyspepsia  LORazepam     Tablet 2 milliGRAM(s) Oral every 6 hours PRN Symptom-triggered: each CIWA -Ar score 8 or GREATER  magnesium hydroxide Suspension 30 milliLiter(s) Oral daily PRN Constipation  melatonin. 3 milliGRAM(s) Oral at bedtime PRN Insomnia  traZODone 50 milliGRAM(s) Oral daily PRN 2nd line insomnia  
MEDICATIONS  (STANDING):  ARIPiprazole 10 milliGRAM(s) Oral daily  buDESOnide    EC Capsule 9 milliGRAM(s) Oral daily    MEDICATIONS  (PRN):  acetaminophen     Tablet .. 650 milliGRAM(s) Oral every 6 hours PRN Moderate Pain (4 - 6)  aluminum hydroxide/magnesium hydroxide/simethicone Suspension 30 milliLiter(s) Oral every 6 hours PRN Dyspepsia  LORazepam     Tablet 2 milliGRAM(s) Oral every 6 hours PRN Symptom-triggered: each CIWA -Ar score 8 or GREATER  magnesium hydroxide Suspension 30 milliLiter(s) Oral daily PRN Constipation  melatonin. 5 milliGRAM(s) Oral at bedtime PRN Insomnia  traZODone 50 milliGRAM(s) Oral daily PRN 2nd line insomnia  
MEDICATIONS  (STANDING):  ARIPiprazole 10 milliGRAM(s) Oral daily  buDESOnide    EC Capsule 9 milliGRAM(s) Oral daily    MEDICATIONS  (PRN):  acetaminophen     Tablet .. 650 milliGRAM(s) Oral every 6 hours PRN Moderate Pain (4 - 6)  aluminum hydroxide/magnesium hydroxide/simethicone Suspension 30 milliLiter(s) Oral every 6 hours PRN Dyspepsia  LORazepam     Tablet 2 milliGRAM(s) Oral every 6 hours PRN Symptom-triggered: each CIWA -Ar score 8 or GREATER  magnesium hydroxide Suspension 30 milliLiter(s) Oral daily PRN Constipation  melatonin. 3 milliGRAM(s) Oral at bedtime PRN Insomnia  traZODone 50 milliGRAM(s) Oral daily PRN 2nd line insomnia

## 2021-12-06 NOTE — BH INPATIENT PSYCHIATRY PROGRESS NOTE - NSBHFUPINTERVALHXFT_PSY_A_CORE
Patient seen with nurse manager in her room. Cooperative and interactive on approach. Remains discharge focused, aware of plan for discharge tomorrow once aftercare appt is secured. No reported si/hi/avh or paranoid ideation. She has maintained contact with her family throughout admission and is looking forward to seeing her mother. She states that sleep has been poor on the unit due to ambient noise, but anticipates that sleep will be fair once she is home. She reported ongoing multiple episodes of diarrhea w/o blood, feeling urgency each time. Is aware of plan to provide enema for this evening. GI recommends that pt follow up with her next scheduled Humira on Thursday. Recs are appreciated.     Writer spoke with brother Nii [phone] go share plan for discharge tomorrow and pt's progress thus far. He had no concerns or reservations regarding discharge plan for tomorrow and will be picking her up.

## 2021-12-06 NOTE — BH INPATIENT PSYCHIATRY PROGRESS NOTE - NSBHCONSULTIPREASON_PSY_A_CORE
other reason
danger to self; mental illness expected to respond to inpatient care

## 2021-12-06 NOTE — BH PSYCHOLOGY ASSESSMENT - NSBHPSYCHOLASREASON_PSY_A_CORE FT
Completed CAMS assessment to assess suicidality.   Pt reported that she no longer feels suicidal, and did act because she wanted to numb her emotional pain, which was triggered by multiple events happening at the same time, including being physically sick, worrying about her class, her brother getting engaged, and family not taking her physical sickness seriously enough to come to see her/support her, and not postponing the engagement, and her partner not being able to come from  to support her. PTable to dismiss it, but than on Monday, felt that she was behind in classes and also was feeling physically sick that she took all her psychiatric meds. PT reported that she was fighting with her partner in  all week last week, and on Monday as well. Then, after taking the meds, she called her friend and she was brought to [facility], now she feels that she still feels angry at partner and mom and also herself for acting this way, but felt that noone took her seriously, and also feels that she is like a failure for having chronic psychiatric and physical conditions. PT reported that setting her life in order is crucial, along with having some better communication and acceptance from her family, regarding their culture, and their expectations of her, such as her and her partner unable to live together, bcs family does not accept this unless he has a stable job, so, partner and she are living apart. PT reported that another thing that depressed her was all Masters friends having parties and having fun, and herself feeling that she is the unpopular one and feeling negatively about herself as a result of this.Pt also reported that she was on steroids for the ulcerative colitis, and that also contributed to her feelign more depressed. Pt reported that her way of feeling emotions so negatively and having anger towards family is not in line with the reality and accepts that she may  be feeling more emotional than usual. Agrees that she still feels depressed, but no longer SI
Completed CAMS assessment to assess suicidality.   Pt reported low scores in all aspects of CAMS, and reported that controlling her emotions, and working on stress will be important ways in which she will manage her suicidality. Pt reported that she spoke to her mother, and cannot wait to get discharged, this writer and the patient completed her safety plan and pt identified triggers, warning signs, and coping skills. PT was very reflective discussing her triggers and warning signs. Also discussed that she had been much more calmer, and forthcoming about her desires from her loved ones, and how she can ask them for her needs without necessarily expecting them to understand without her voicing. PT is future oriented and denied SI. Feels safe for dc.

## 2021-12-06 NOTE — BH INPATIENT PSYCHIATRY PROGRESS NOTE - PRN MEDS
MEDICATIONS  (PRN):  acetaminophen     Tablet .. 650 milliGRAM(s) Oral every 6 hours PRN Moderate Pain (4 - 6)  aluminum hydroxide/magnesium hydroxide/simethicone Suspension 30 milliLiter(s) Oral every 6 hours PRN Dyspepsia  LORazepam     Tablet 2 milliGRAM(s) Oral every 6 hours PRN Symptom-triggered: each CIWA -Ar score 8 or GREATER  magnesium hydroxide Suspension 30 milliLiter(s) Oral daily PRN Constipation  melatonin. 3 milliGRAM(s) Oral at bedtime PRN Insomnia  traZODone 50 milliGRAM(s) Oral daily PRN 2nd line insomnia  
MEDICATIONS  (PRN):  acetaminophen     Tablet .. 650 milliGRAM(s) Oral every 6 hours PRN Moderate Pain (4 - 6)  aluminum hydroxide/magnesium hydroxide/simethicone Suspension 30 milliLiter(s) Oral every 6 hours PRN Dyspepsia  LORazepam     Tablet 2 milliGRAM(s) Oral every 6 hours PRN Symptom-triggered: each CIWA -Ar score 8 or GREATER  magnesium hydroxide Suspension 30 milliLiter(s) Oral daily PRN Constipation  melatonin. 5 milliGRAM(s) Oral at bedtime PRN Insomnia  traZODone 50 milliGRAM(s) Oral daily PRN 2nd line insomnia  
MEDICATIONS  (PRN):  acetaminophen     Tablet .. 650 milliGRAM(s) Oral every 6 hours PRN Moderate Pain (4 - 6)  aluminum hydroxide/magnesium hydroxide/simethicone Suspension 30 milliLiter(s) Oral every 6 hours PRN Dyspepsia  LORazepam     Tablet 2 milliGRAM(s) Oral every 6 hours PRN Symptom-triggered: each CIWA -Ar score 8 or GREATER  magnesium hydroxide Suspension 30 milliLiter(s) Oral daily PRN Constipation  melatonin. 3 milliGRAM(s) Oral at bedtime PRN Insomnia  traZODone 50 milliGRAM(s) Oral daily PRN 2nd line insomnia  
MEDICATIONS  (PRN):  acetaminophen     Tablet .. 650 milliGRAM(s) Oral every 6 hours PRN Moderate Pain (4 - 6)  aluminum hydroxide/magnesium hydroxide/simethicone Suspension 30 milliLiter(s) Oral every 6 hours PRN Dyspepsia  LORazepam     Tablet 2 milliGRAM(s) Oral every 6 hours PRN Symptom-triggered: each CIWA -Ar score 8 or GREATER  magnesium hydroxide Suspension 30 milliLiter(s) Oral daily PRN Constipation  melatonin. 3 milliGRAM(s) Oral at bedtime PRN Insomnia  traZODone 50 milliGRAM(s) Oral daily PRN 2nd line insomnia

## 2021-12-06 NOTE — BH INPATIENT PSYCHIATRY PROGRESS NOTE - NSCGIIMPROVESX_PSY_ALL_CORE
3 = Minimally improved - slightly better with little or no clinically meaningful reduction of symptoms.  Represents very little change in basic clinical status, level of care, or functional capacity.
3 = Minimally improved - slightly better with little or no clinically meaningful reduction of symptoms.  Represents very little change in basic clinical status, level of care, or functional capacity.

## 2021-12-06 NOTE — BH PSYCHOLOGY ASSESSMENT - NSBHPSYCHOLASBEHAV_PSY_A_CORE FT
APPEARANCE:well groomed, dressed in hospital gowns.     SENSORY PERCEPTION: good    EYE CONTACT: good    MOVEMENT: normal    SPEECH:normal    THOUGHT PROCESSING:goal oriented.     ORIENTATION:X3    THOUGHT CONTENT:denied SI    MOOD:depressed    SUICIDAL/ HOMICIDAL IDEATION:denied    AFFECT: constricted    COOPERATION:cooperative
APPEARANCE: well groomed    SENSORY PERCEPTION: wnl    EYE CONTACT: good    MOVEMENT: wnl    SPEECH:wnl    THOUGHT PROCESSING: goal orietned    ORIENTATION:x3    THOUGHT CONTENT: denied SI/HI    MOOD: anxious  SUICIDAL/ HOMICIDAL IDEATION:denied    AFFECT: full range of affect    COOPERATION: fully cooperative.

## 2021-12-06 NOTE — BH INPATIENT PSYCHIATRY PROGRESS NOTE - NSTXDEPRESGOAL_PSY_ALL_CORE
Attend and participate in at least 2 groups daily despite low mood/energy

## 2021-12-07 VITALS
DIASTOLIC BLOOD PRESSURE: 70 MMHG | OXYGEN SATURATION: 100 % | WEIGHT: 192.9 LBS | TEMPERATURE: 98 F | HEART RATE: 83 BPM | SYSTOLIC BLOOD PRESSURE: 106 MMHG

## 2021-12-07 PROCEDURE — 84702 CHORIONIC GONADOTROPIN TEST: CPT

## 2021-12-07 PROCEDURE — 96361 HYDRATE IV INFUSION ADD-ON: CPT

## 2021-12-07 PROCEDURE — 83735 ASSAY OF MAGNESIUM: CPT

## 2021-12-07 PROCEDURE — 36415 COLL VENOUS BLD VENIPUNCTURE: CPT

## 2021-12-07 PROCEDURE — 83036 HEMOGLOBIN GLYCOSYLATED A1C: CPT

## 2021-12-07 PROCEDURE — 87635 SARS-COV-2 COVID-19 AMP PRB: CPT

## 2021-12-07 PROCEDURE — 83993 ASSAY FOR CALPROTECTIN FECAL: CPT

## 2021-12-07 PROCEDURE — 82962 GLUCOSE BLOOD TEST: CPT

## 2021-12-07 PROCEDURE — 80061 LIPID PANEL: CPT

## 2021-12-07 PROCEDURE — 80307 DRUG TEST PRSMV CHEM ANLYZR: CPT

## 2021-12-07 PROCEDURE — 81025 URINE PREGNANCY TEST: CPT

## 2021-12-07 PROCEDURE — 87507 IADNA-DNA/RNA PROBE TQ 12-25: CPT

## 2021-12-07 PROCEDURE — 96127 BRIEF EMOTIONAL/BEHAV ASSMT: CPT

## 2021-12-07 PROCEDURE — 85025 COMPLETE CBC W/AUTO DIFF WBC: CPT

## 2021-12-07 PROCEDURE — 82803 BLOOD GASES ANY COMBINATION: CPT

## 2021-12-07 PROCEDURE — 84443 ASSAY THYROID STIM HORMONE: CPT

## 2021-12-07 PROCEDURE — 99285 EMERGENCY DEPT VISIT HI MDM: CPT | Mod: 25

## 2021-12-07 PROCEDURE — 85027 COMPLETE CBC AUTOMATED: CPT

## 2021-12-07 PROCEDURE — 87449 NOS EACH ORGANISM AG IA: CPT

## 2021-12-07 PROCEDURE — 71045 X-RAY EXAM CHEST 1 VIEW: CPT

## 2021-12-07 PROCEDURE — 86140 C-REACTIVE PROTEIN: CPT

## 2021-12-07 PROCEDURE — 84100 ASSAY OF PHOSPHORUS: CPT

## 2021-12-07 PROCEDURE — 96360 HYDRATION IV INFUSION INIT: CPT

## 2021-12-07 PROCEDURE — 80053 COMPREHEN METABOLIC PANEL: CPT

## 2021-12-07 PROCEDURE — 99238 HOSP IP/OBS DSCHRG MGMT 30/<: CPT

## 2021-12-07 PROCEDURE — 87324 CLOSTRIDIUM AG IA: CPT

## 2021-12-07 RX ORDER — MESALAMINE 400 MG
60 TABLET, DELAYED RELEASE (ENTERIC COATED) ORAL
Qty: 420 | Refills: 0
Start: 2021-12-07 | End: 2021-12-13

## 2021-12-07 RX ORDER — ARIPIPRAZOLE 15 MG/1
1 TABLET ORAL
Qty: 0 | Refills: 0 | DISCHARGE
Start: 2021-12-07

## 2021-12-07 RX ORDER — MESALAMINE 400 MG
60 TABLET, DELAYED RELEASE (ENTERIC COATED) ORAL
Qty: 0 | Refills: 0 | DISCHARGE
Start: 2021-12-07

## 2021-12-07 RX ORDER — BUDESONIDE, MICRONIZED 100 %
1 POWDER (GRAM) MISCELLANEOUS
Qty: 14 | Refills: 0
Start: 2021-12-07 | End: 2021-12-20

## 2021-12-07 RX ORDER — ARIPIPRAZOLE 15 MG/1
1 TABLET ORAL
Qty: 10 | Refills: 0
Start: 2021-12-07 | End: 2021-12-16

## 2021-12-07 RX ORDER — BUDESONIDE, MICRONIZED 100 %
1 POWDER (GRAM) MISCELLANEOUS
Qty: 7 | Refills: 0
Start: 2021-12-07 | End: 2021-12-13

## 2021-12-07 RX ADMIN — Medication 5 MILLIGRAM(S): at 00:32

## 2021-12-07 RX ADMIN — Medication 9 MILLIGRAM(S): at 10:38

## 2021-12-07 RX ADMIN — ARIPIPRAZOLE 10 MILLIGRAM(S): 15 TABLET ORAL at 10:38
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
## 2021-12-07 NOTE — PROGRESS NOTE ADULT - SUBJECTIVE AND OBJECTIVE BOX
GASTROENTEROLOGY PROGRESS NOTE  Patient seen and examined at bedside. Ordered for mesalamine enema, patient reports not taking it last night out of concern for not being able to retain the enema long enough for it to be effective.    ROS: Patient reports no blood in her stools, still reporting tenesmus, reporting 5-6 BMs since yesterday afternoon. No nausea/vomiting, tolerating PO.     PERTINENT REVIEW OF SYSTEMS:  CONSTITUTIONAL: No weakness, fevers or chills  HEENT: No visual changes; No vertigo or throat pain   GASTROINTESTINAL: As above.  NEUROLOGICAL: No numbness or weakness  SKIN: No itching, burning, rashes, or lesions     Allergies    No Known Allergies    Intolerances      MEDICATIONS:  MEDICATIONS  (STANDING):  ARIPiprazole 10 milliGRAM(s) Oral daily  buDESOnide    EC Capsule 9 milliGRAM(s) Oral daily  mesalamine Enema 4 Gram(s) Rectal at bedtime    MEDICATIONS  (PRN):  acetaminophen     Tablet .. 650 milliGRAM(s) Oral every 6 hours PRN Moderate Pain (4 - 6)  aluminum hydroxide/magnesium hydroxide/simethicone Suspension 30 milliLiter(s) Oral every 6 hours PRN Dyspepsia  LORazepam     Tablet 2 milliGRAM(s) Oral every 6 hours PRN Symptom-triggered: each CIWA -Ar score 8 or GREATER  magnesium hydroxide Suspension 30 milliLiter(s) Oral daily PRN Constipation  melatonin. 5 milliGRAM(s) Oral at bedtime PRN Insomnia  traZODone 50 milliGRAM(s) Oral daily PRN 2nd line insomnia    Vital Signs Last 24 Hrs  T(C): 36.7 (07 Dec 2021 09:54), Max: 37 (06 Dec 2021 17:29)  T(F): 98.1 (07 Dec 2021 09:54), Max: 98.6 (06 Dec 2021 17:29)  HR: 83 (07 Dec 2021 09:54) (82 - 83)  BP: 106/70 (07 Dec 2021 09:54) (106/70 - 111/73)  BP(mean): --  RR: 18 (06 Dec 2021 17:29) (18 - 18)  SpO2: 100% (07 Dec 2021 09:54) (99% - 100%)    PHYSICAL EXAM:    General: Well developed; well nourished; in no acute distress  Eyes: Anicteric sclerae, moist conjunctivae  HENT: Moist mucous membranes   Neck: Trachea midline, supple  Lungs: Normal respiratory effort, no intercostal retractions, non-labored breathing   Cardiovascular: RRR, +S1,+S2  Abdomen: Soft, non-distended; slight tenderness in suprapubic region; normal bowel sounds; No rebound or guarding  Extremities: Normal range of motion, No clubbing, cyanosis or edema  Neurological: Alert and oriented x3, no focal deficits, UE and LE strength WNL   Skin: Warm and dry. No obvious rash    LABS:                        11.6   9.86  )-----------( 445      ( 06 Dec 2021 12:33 )             38.5     12-06    141  |  106  |  10  ----------------------------<  119<H>  4.0   |  26  |  0.70    Ca    9.4      06 Dec 2021 12:33    TPro  8.2  /  Alb  4.3  /  TBili  0.2  /  DBili  x   /  AST  24  /  ALT  30  /  AlkPhos  54  12-06                      GI PCR Panel, Stool (collected 06 Dec 2021 14:09)  Source: .Stool None  Final Report (06 Dec 2021 14:10):    GI PCR Results: NOT detected    *******Please Note:*******    GI panel PCR evaluates for:    Campylobacter, Plesiomonas shigelloides, Salmonella,    Vibrio, Yersinia enterocolitica, Enteroaggregative    Escherichia coli (EAEC), Enteropathogenic E.coli (EPEC),    Enterotoxigenic E. coli (ETEC) lt/st, Shiga-like    toxin-producing E. coli (STEC) stx1/stx2,    Shigella/ Enteroinvasive E. coli (EIEC), Cryptosporidium,    Cyclospora cayetanensis, Entamoeba histolytica,    Giardia lamblia, Adenovirus F 40/41, Astrovirus,    Norovirus GI/GII, Rotavirus A, Sapovirus      RADIOLOGY & ADDITIONAL STUDIES:  Reviewed
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
## 2021-12-07 NOTE — BH TREATMENT PLAN - NSTXPLANTHERAPYSESSIONSFT_PSY_ALL_CORE
12-04-21  Type of therapy: Leisure development,Games group  Type of session: Group  Level of patient participation: Engaged,Participates  Duration of participation: 60 minutes  Therapy conducted by: Psych rehab  Therapy Summary: Pt declined an offer for individual art therapy endorsing preference to attend the games group instead. Pt has been declining most groups, but stayed for full duration of this group and participated throughout. Pt volunteered to lead one round of the game, and participated in other rounds with guessing the puzzle. Pt was organized and followed along easily. Pt kept mainly to herself other than playing the game (not engaging with peers in social or playful dialogue), and displayed a low range of affect throughout group.

## 2021-12-07 NOTE — BH TREATMENT PLAN - NSTXDEPRESINTERRN_PSY_ALL_CORE
Encourage patient to adhere with prescribed medications and treatment, participation in groups and unit activities, verbalization of feelings and concerns.

## 2021-12-07 NOTE — BH TREATMENT PLAN - NSTXSUICIDINTERRN_PSY_ALL_CORE
Assess for suicidal thoughts, and institute a no-harm self-contract and suicidal precautions, as indicated.

## 2021-12-07 NOTE — PROGRESS NOTE ADULT - ASSESSMENT
Assessment:   26F with PMH of Ulcerative Colitis (first diagnosed in 2018, on Humira infusions q2 weeks, follows with Dr. Christopher Mcclellan at [facility]), bipolar disorder II, possible borderline personality disorder, and pelvic pain (unknown origin, being worked up by OB/GYN), who presented after ingestion of Abilify and Klonopin. Initially admitted to inpatient psych due to concern for possible suicide attempt. GI consulted for possible UC flare (diarrhea 10-12 episodes per day, blood in stool, abdominal pain) x 2 weeks. Now started on budesonide with some symptom improvement.     Recommendations:   #History of Ulcerative Colitis with concern for UC flare:   First diagnosed in 2018, on Humira infusions q2 weeks, follows with Dr. Christopher Mcclellan at [facility]. Per patient, has trialed prednisone 3-4 times in the past and never been able to tolerate it due to the mood side effects. Currently complaining of frequent bowel movements (10x per day), urgency, and occasional bloody stools. WBC 16 upon admission with no neutrophilic predominance or obvious source of infection at this time, so possibly 2/2 to steroids. Started on Budesonide on 12/03 and had tolerated it well without any mood side effects. CRP 16.5 this admission.   - continue with Budesonide 9 mg PO qHS  - Ordered for Mesalamine enema 4 mg daily 12/6, patient notes not taking it 12/6 however amenable to starting today  - Labs from 12/6 WNL  - GI PCR and Cdiff testing negative  - f/u stool calprotectin (pending in lab)   - obtain collateral from patient's GI (Dr. Mcclellan) about prior work-up and Humira treatments (out-patient was due for next infusion on 12/09)   - per patient, still with frequent BMs however no longer reporting any blood in her stools. Reports some abdominal pain, mainly before a BM which resolves with a bowel movement.   - Planned for discharge today, discussed that she will need to have close follow up with her outpatient GI provider, Dr Mcclellan going forward to discuss her antibody testing and treatment with Humira going forward. In the interim, advised that she continue with the Budesonide and Mesalamine enemas or suppositories (as per patient preference) to help with her UC flare    Case discussed with [facility] attending and primary team.     Jamaica Moya DO  Gastroenterology Fellow  Pager: [phone]

## 2021-12-07 NOTE — BH INPATIENT PSYCHIATRY DISCHARGE NOTE - DESCRIPTION
Resides alone. Has a boyfriend who resides in the [place]. 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 Patient's family primarily resides in [place]. Patient returned from [place] in August after a long period to attend school. Patient never , no children.

## 2021-12-07 NOTE — BH TREATMENT PLAN - NSCMSPTSTRENGTHS_PSY_ALL_CORE
Lani Babcock is here for IV hydration due to gastrparesis.    ECOG:    ECOG [10/06/20 1043]   ECOG Performance Status 0       Nursing Assessment:  A comprehensive nursing assessment was performed and the patient reports the following:    Fatigue/Weakness: YES, patient knows limit and rest/naps as needed  Anxiety/Depression/Insomnia: YES, patient currently being treated for depression, voiced issues with her teenage children       Vitals:  See documentation     Weight:  Wt Readings from Last 1 Encounters:   10/02/20 74.6 kg     Labs:  Sodium (mmol/L)   Date Value   07/29/2020 138   12/04/2019 141     Potassium (mmol/L)   Date Value   07/29/2020 3.7   12/04/2019 4.6     Chloride (mmol/L)   Date Value   07/29/2020 106   12/04/2019 107     Glucose (mg/dL)   Date Value   07/29/2020 91   12/04/2019 91     CALCIUM (mg/dL)   Date Value   12/04/2019 8.5     Calcium (mg/dL)   Date Value   07/29/2020 8.5     Carbon Dioxide (mmol/L)   Date Value   07/29/2020 22   12/04/2019 26     BUN (mg/dL)   Date Value   07/29/2020 11   12/04/2019 8     Creatinine (mg/dL)   Date Value   07/29/2020 1.16 (H)   12/04/2019 0.99 (H)     MAGNESIUM (mg/dL)   Date Value   12/04/2019 1.9       Treatment:  Refer to LDA and MAR for line assessment and medication administration    Post Treatment: Treatment tolerated well; no adverse reaction    Does the Patient have a central line? yes:   Device: Port  Central Line Site: Left and Chest  Central Line Site Appearance: clear  Implanted port accessed using a 20 gauge non-coring needle primed with 0.9% sodium chloride. Good blood return obtained.  Blood not collected.  Site Care: Aseptic site care per protocol, Sterile gauze and tape dressing applied and Injection caps changed/applied  Central line flushed with: 10 ml 0.9 normal saline and 100 un/ml- 500 units heparin  Central line removed: no  Peguero Needle: Peguero needle de-accessed      Patient Education: Instructed on no new instructions     Patient 
Discharged: patient discharged to home per self, ambulatory    Next appointment scheduled: 10/09/2020 IVF  Patient instructed to call the office with any questions or concerns.  Deidra Livingston NP is supervising clinician today.    Fall risk NO fall risk  ([place] Fall Risk)    Distress Tool   NA    Administrations This Visit     heparin 100 UNIT/ML lock flush 500 Units     Admin Date  10/06/2020 Action  Given Dose  500 Units Route  Intracatheter Administered By  Donny Keith RN          sodium chloride (NORMAL SALINE) 0.9 % bolus 1,000 mL     Admin Date  10/06/2020 Action  New Bag Dose  1000 mL Rate  999 mL/hr Route  Intravenous Administered By  Celia Braga RN                
Assertive/Compliance to treatment/Expressive of emotions/Financial stability/Future/goal oriented/Highly motivated for treatment/Intact family/Intelligence/Resourceful/Self confidence/Strong support system/Successful coping history/Supportive family
Assertive/Compliance to treatment/Expressive of emotions/Financial stability/Future/goal oriented/Highly motivated for treatment/Intact family/Intelligence/Resourceful/Self confidence/Strong support system/Successful coping history/Supportive family
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
## 2021-12-07 NOTE — BH TREATMENT PLAN - NSPTSTATEDGOAL_PSY_ALL_CORE
Patient is attending graduate school at [employer] for public policy and wants to continue their education
Patient is attending graduate school at [employer] for public policy and wants to continue their education

## 2021-12-07 NOTE — BH INPATIENT PSYCHIATRY DISCHARGE NOTE - NSDCCPCAREPLAN_GEN_ALL_CORE_FT
PRINCIPAL DISCHARGE DIAGNOSIS  Diagnosis: Bipolar II disorder  Assessment and Plan of Treatment: Continue current medication regimen and follow up with aftercare appointments      SECONDARY DISCHARGE DIAGNOSES  Diagnosis: Borderline personality disorder  Assessment and Plan of Treatment: Continue current medication regimen and follow up with aftercare appointments

## 2021-12-07 NOTE — BH INPATIENT PSYCHIATRY DISCHARGE NOTE - NSBHMETABOLIC_PSY_ALL_CORE_FT
BMI: BMI (kg/m2): 30.7 (11-29-21 @ 19:06)  HbA1c: A1C with Estimated Average Glucose Result: 5.6 % (12-02-21 @ 09:31)    Glucose: POCT Blood Glucose.: 99 mg/dL (11-29-21 @ 19:31)    BP: 106/70 (12-07-21 @ 09:54) (106/70 - 123/80)  Lipid Panel: Date/Time: 12-02-21 @ 09:31  Cholesterol, Serum: 214  Direct LDL: --  HDL Cholesterol, Serum: 53  Total Cholesterol/HDL Ration Measurement: --  Triglycerides, Serum: 157

## 2021-12-07 NOTE — BH INPATIENT PSYCHIATRY DISCHARGE NOTE - NSDCMRMEDTOKEN_GEN_ALL_CORE_FT
ARIPiprazole 10 mg oral tablet: 1 tab(s) orally once a day  sfRowasa 4 g/60 mL rectal enema: 60 milliliter(s) rectal once a day (at bedtime)   ARIPiprazole 10 mg oral tablet: 1 tab(s) orally once a day  Ortikos 9 mg oral capsule, extended release: 1 cap(s) orally once a day (in the morning)  sfRowasa 4 g/60 mL rectal enema: 60 milliliter(s) rectal once a day (at bedtime)

## 2021-12-09 ENCOUNTER — OUTPATIENT (OUTPATIENT)
Dept: OUTPATIENT SERVICES | Facility: HOSPITAL | Age: 26
LOS: 1 days | Discharge: HOME | End: 2021-12-09
Payer: COMMERCIAL

## 2021-12-09 LAB — CALPROTECTIN STL-MCNT: 1355 UG/G — HIGH (ref 0–120)

## 2021-12-10 DIAGNOSIS — F60.3 BORDERLINE PERSONALITY DISORDER: ICD-10-CM

## 2021-12-10 DIAGNOSIS — F31.81 BIPOLAR II DISORDER: ICD-10-CM

## 2021-12-10 DIAGNOSIS — K51.90 ULCERATIVE COLITIS, UNSPECIFIED, WITHOUT COMPLICATIONS: ICD-10-CM

## 2021-12-10 DIAGNOSIS — T43.592A POISONING BY OTHER ANTIPSYCHOTICS AND NEUROLEPTICS, INTENTIONAL SELF-HARM, INITIAL ENCOUNTER: ICD-10-CM

## 2021-12-10 PROCEDURE — 90853 GROUP PSYCHOTHERAPY: CPT | Mod: 95

## 2021-12-14 PROCEDURE — 90792 PSYCH DIAG EVAL W/MED SRVCS: CPT | Mod: 95

## 2021-12-29 DIAGNOSIS — F31.81 BIPOLAR II DISORDER: ICD-10-CM

## 2022-01-06 PROCEDURE — 99214 OFFICE O/P EST MOD 30 MIN: CPT | Mod: 95

## 2022-01-14 PROCEDURE — 90853 GROUP PSYCHOTHERAPY: CPT | Mod: 95

## 2022-05-12 NOTE — ED ADULT NURSE REASSESSMENT NOTE - NS ED NURSE REASSESS COMMENT FT1
Pt resting/sleeping in bed at this time. Pt on continued one to one pending bed placement. [facility] [place]; [id number] [facility] [id number]
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
## 2023-08-08 NOTE — BH INPATIENT PSYCHIATRY PROGRESS NOTE - NSDCCRITERIA_PSY_ALL_CORE
Patient ID: [id number]     Chief Complaint: Wellness      HPI:     Kiki Vail is a 62 y.o. female here today for a Medicare Wellness.       Opioid Screening: Patient medication list reviewed, patient is not taking prescription opioids. Patient is not using additional opioids than prescribed. Patient is not at low risk of substance abuse based on this opioid use history.       ----------------------------  Anxiety  CAD (coronary artery disease)  CHF (congestive heart failure)  Closed sternal manubrial dissociation, initial encounter  Depression  Dialysis patient      Comment:  M-W-F  Diverticulosis  End stage renal disease  GERD (gastroesophageal reflux disease)  Hemodialysis access site with mature fistula  HTN (hypertension)  Insomnia  Midsternal chest pain  Narcolepsy  Other hyperlipidemia  Pleural effusion  PONV (postoperative nausea and vomiting)  Restless leg syndrome  Sleep apnea, unspecified      Comment:  CPAP  SOB (shortness of breath) on exertion  Spider angioma      Comment:  per patient in small intestines?  Thyroid disease  Ventral hernia without obstruction or gangrene     Past Surgical History:   Procedure Laterality Date    APPENDECTOMY  2001    CHOLECYSTECTOMY      CORONARY ARTERY BYPASS GRAFT (CABG) N/A 11/23/2022    Procedure: CORONARY ARTERY BYPASS GRAFT (CABG);  Surgeon: Pierce Osborne IV, MD;  Location: [facility] OR;  Service: Cardiothoracic;  Laterality: N/A;  CABG / MVR / LLAA  //   ECHO NOTIFIED    EYE SURGERY      FRACTURE SURGERY  2021    HYSTERECTOMY      INSERTION, VASCULAR ACCESS CATHETER N/A 12/12/2022    Procedure: INSERTION, VASCULAR ACCESS CATHETER;  Surgeon: Livia Carvajal MD;  Location: [facility] OR;  Service: Peripheral Vascular;  Laterality: N/A;    KNEE ARTHROSCOPY W/ MENISCECTOMY Right     LEFT HEART CATHETERIZATION Left 11/18/2022    Procedure: CATHETERIZATION, HEART, LEFT;  Surgeon: Chad Kaur MD;  Location: [facility] CATH LAB;  Service: Cardiology;  Laterality: 
Left;  [facility]    MITRAL VALVE REPLACEMENT N/A 11/23/2022    Procedure: REPLACEMENT, MITRAL VALVE;  Surgeon: Pierce Osborne IV, MD;  Location: [facility] OR;  Service: Cardiothoracic;  Laterality: N/A;    ORIF FEMUR FRACTURE  2021    per patient, broke leg last year from fall, has larissa (2021    ORIF HIP FRACTURE  2021    per patient, broke hip last year from fall (2021)    PERCUTANEOUS CORONARY INTERVENTION (PCI) FOR CHRONIC TOTAL OCCLUSION OF CORONARY ARTERY      stent x1    PLATING OF STERNUM N/A 6/2/2023    Procedure: PLATING, STERNAL;  Surgeon: Pierce Osborne IV, MD;  Location: [facility] OR;  Service: Cardiovascular;  Laterality: N/A;  WITH ESCHETE - OPEN VENTRAL HERNIA REPAIR    REMOVAL OF DRAIN N/A 11/28/2022    Procedure: REMOVAL, DRAIN;  Surgeon: Pierce Osborne IV, MD;  Location: [facility] OR;  Service: Cardiology;  Laterality: N/A;  REMOVAL OF MEDIASTINAL CHEST TUBE    REPAIR, HERNIA, VENTRAL N/A 6/2/2023    Procedure: REPAIR, HERNIA, VENTRAL;  Surgeon: Tahir De Paz Jr., MD;  Location: [facility] OR;  Service: General;  Laterality: N/A;    REVISION OF ARTERIOVENOUS FISTULA Left 12/12/2022    Procedure: REVISION, AV FISTULA;  Surgeon: Livia Carvajal MD;  Location: [facility] OR;  Service: Peripheral Vascular;  Laterality: Left;  LEFT BRACHIOCEPHALIC FISTULA REVISION // VASCULAR // SUPINE // SUPRACLAVICULAR BLOCK    TONSILLECTOMY         Review of patient's allergies indicates:   Allergen Reactions    Ace inhibitors Swelling    Baclofen Hallucinations, Other (See Comments) and Anxiety    Chocolate flavor Swelling    Adhesive tape-silicones Rash    Tamiflu [oseltamivir] Rash    Gabapentin      Other reaction(s): lethargic    Bupropion hcl Anxiety    Pregabalin Itching     Other reaction(s): feels high       Outpatient Medications Marked as Taking for the 8/8/23 encounter (Office Visit) with Kuldeep Landis MD   Medication Sig Dispense Refill    amiodarone (PACERONE) 400 MG tablet Take 0.5 tablets (200 mg 
total) by mouth once daily. 30 tablet 3    aspirin (ECOTRIN) 81 MG EC tablet Aspir-81 mg tablet,delayed release   Take 1 tablet every day by oral route.      atorvastatin (LIPITOR) 40 MG tablet TAKE 1 TABLET BY MOUTH EVERY DAY 90 tablet 1    B complex-vitamin C-folic acid (NEPHRO-EMERALD) 0.8 mg Tab Take by mouth once daily.      carvediloL (COREG) 25 MG tablet TAKE 1 TABLET BY MOUTH TWICE A DAY WITH FOOD 180 tablet 0    citalopram (CELEXA) 10 MG tablet TAKE 1 TABLET BY MOUTH EVERY DAY 90 tablet 1    cloNIDine (CATAPRES) 0.1 MG tablet clonidine HCl 0.1 mg tablet   TAKE 1 TABLET BY MOUTH TWICE A DAY      ferric citrate (AURYXIA) 210 mg iron Tab Take 420 mg by mouth 3 (three) times daily.      ferrous sulfate (FEOSOL) 325 mg (65 mg iron) Tab tablet ferrous sulfate 325 mg (65 mg iron) tablet   Take 1 tablet every day by oral route for 30 days.      fluticasone propionate (FLONASE) 50 mcg/actuation nasal spray INHALE 1 SPRAY (50 MCG TOTAL) INTO EACH NOSTRIL EVERY DAY 16 mL 1    hydrALAZINE (APRESOLINE) 50 MG tablet Take 50 mg by mouth 3 (three) times daily.      isosorbide mononitrate (IMDUR) 60 MG 24 hr tablet TAKE 1 TABLET BY MOUTH EVERY DAY IN THE MORNING 90 tablet 1    levothyroxine (SYNTHROID) 50 MCG tablet TAKE 1 TABLET BY MOUTH EVERY DAY BEFORE BREAKFAST 90 tablet 0    LOKELMA 5 gram packet Take 5 g by mouth every 30 days. 1 packet on Tuesday, Thursday, Saturday and Sunday      loratadine (CLARITIN) 10 mg tablet Take 10 mg by mouth once daily.      MEPOLIZUMAB 100 mg/mL autoinjector Inject 5 mg into the skin every 30 days.      methoxy peg-epoetin beta (MIRCERA INJ) Inject 200 mcg as directed every 30 days.      methylphenidate HCl (RITALIN) 20 MG tablet methylphenidate 20 mg tablet  TAKE 1 TABLET BY MOUTH TWICE A DAY 60 tablet 0    pantoprazole (PROTONIX) 40 MG tablet Take 1 tablet (40 mg total) by mouth once daily. 90 tablet 1    rOPINIRole (REQUIP) 4 MG tablet TAKE 1 TABLET BY MOUTH EVERY DAY 
NIGHTLY 90 tablet 1    traZODone (DESYREL) 50 MG tablet Take 1 tablet (50 mg total) by mouth every evening. 90 tablet 1       Social History     Socioeconomic History    Marital status:    Tobacco Use    Smoking status: Former     Current packs/day: 0.00     Types: Cigarettes     Start date:      Quit date: 2013     Years since quittin.6    Smokeless tobacco: Never   Substance and Sexual Activity    Alcohol use: Never    Drug use: Never    Sexual activity: Not Currently     Social Determinants of Health     Financial Resource Strain: High Risk (2023)    Overall Financial Resource Strain (CARDIA)     Difficulty of Paying Living Expenses: Hard   Food Insecurity: Food Insecurity Present (2023)    Hunger Vital Sign     Worried About Running Out of Food in the Last Year: Often true     Ran Out of Food in the Last Year: Sometimes true   Transportation Needs: No Transportation Needs (2023)    PRAPARE - Transportation     Lack of Transportation (Medical): No     Lack of Transportation (Non-Medical): No   Physical Activity: Inactive (2023)    Exercise Vital Sign     Days of Exercise per Week: 0 days     Minutes of Exercise per Session: 0 min   Stress: No Stress Concern Present (2023)    [place] [place] [employer] - Occupational Stress Questionnaire     Feeling of Stress : Only a little   Recent Concern: Stress - Stress Concern Present (2022)    [place] [employer] - Occupational Stress Questionnaire     Feeling of Stress : Rather much   Social Connections: Unknown (2023)    Social Connection and Isolation Panel [NHANES]     Frequency of Communication with Friends and Family: More than three times a week     Frequency of Social Gatherings with Friends and Family: Three times a week     Active Member of Clubs or Organizations: No     Attends Club or Organization Meetings: Never     Marital Status:    Housing 
"Stability: Low Risk  (1/6/2023)    Housing Stability Vital Sign     Unable to Pay for Housing in the Last Year: No     Number of Places Lived in the Last Year: 1     Unstable Housing in the Last Year: No        Family History   Problem Relation Age of Onset    Heart failure Mother     Hypertension Mother     Thyroid disease Mother     Stroke Mother     Arthritis Mother     Asthma Mother     Depression Mother     Diabetes Mother     Hearing loss Mother     Heart disease Mother     Thyroid disease Sister     Alcohol abuse Father     Cancer Maternal Grandfather     Hearing loss Maternal Grandmother         Patient Care Team:  Kuldeep Landis MD as PCP - General (Family Medicine)  Chad Kaur MD as Consulting Physician (Cardiology)  Allan Curiel MD as Consulting Physician (Nephrology)  Sonia Malone MD as Consulting Physician (Nephrology)  Andres Hendricks MD as Consulting Physician (Orthopedic Surgery)  Ilene Hopson MD as Consulting Physician (Nephrology)  Jonathan Celaya MD as Resident (Cardiology)  Jose Lim MD as Consulting Physician (Pulmonary Disease)  Tahir De Paz Jr., MD as Consulting Physician (General Surgery)  Pierce Osborne IV, MD as Consulting Physician (Cardiothoracic Surgery)       Subjective:     Review of Systems   Constitutional: Negative.    Respiratory: Negative.     Cardiovascular: Negative.    Gastrointestinal: Negative.    Musculoskeletal:         Chest wall pain:  Previous thoracic surgery with Dr. Osborne   Psychiatric/Behavioral: Negative.           Patient Reported Health Risk Assessment       Objective:     /64 (BP Location: Right arm, Patient Position: Sitting, BP Method: Large (Manual))   Pulse 71   Temp 97.1 °F (36.2 °C)   Resp 18   Ht 5' 3" (1.6 m)   Wt 65.3 kg (144 lb)   SpO2 99%   BMI 25.51 kg/m²     Physical Exam  Constitutional:       Appearance: Normal appearance.   Cardiovascular:      Rate and Rhythm: "
Normal rate and regular rhythm.      Heart sounds: Murmur heard.   Pulmonary:      Effort: Pulmonary effort is normal.      Breath sounds: Normal breath sounds.   Abdominal:      General: Abdomen is flat. Bowel sounds are normal.      Palpations: Abdomen is soft.   Musculoskeletal:      Comments: TTP on chest wall, no signs of infection   Neurological:      Mental Status: She is alert.   Psychiatric:         Mood and Affect: Mood normal.         Behavior: Behavior normal.         Thought Content: Thought content normal.         Judgment: Judgment normal.       Recent Results (from the past 504 hour(s))   Comprehensive Metabolic Panel    Collection Time: 08/03/23  7:55 AM   Result Value Ref Range    Sodium Level 143 136 - 145 mmol/L    Potassium Level 5.0 3.5 - 5.1 mmol/L    Chloride 100 98 - 107 mmol/L    Carbon Dioxide 30 23 - 31 mmol/L    Glucose Level 81 (L) 82 - 115 mg/dL    Blood Urea Nitrogen 40.0 (H) 9.8 - 20.1 mg/dL    Creatinine 7.10 (H) 0.55 - 1.02 mg/dL    Calcium Level Total 10.7 (H) 8.4 - 10.2 mg/dL    Protein Total 7.2 5.8 - 7.6 gm/dL    Albumin Level 3.5 3.4 - 4.8 g/dL    Globulin 3.7 (H) 2.4 - 3.5 gm/dL    Albumin/Globulin Ratio 0.9 (L) 1.1 - 2.0 ratio    Bilirubin Total 0.6 <=1.5 mg/dL    Alkaline Phosphatase 170 (H) 40 - 150 unit/L    Alanine Aminotransferase 22 0 - 55 unit/L    Aspartate Aminotransferase 28 5 - 34 unit/L    eGFR 6 mls/min/1.73/m2   Lipid Panel    Collection Time: 08/03/23  7:55 AM   Result Value Ref Range    Cholesterol Total 126 <=200 mg/dL    HDL Cholesterol 48 35 - 60 mg/dL    Triglyceride 68 37 - 140 mg/dL    Cholesterol/HDL Ratio 3 0 - 5    Very Low Density Lipoprotein 14     LDL Cholesterol 64.00 50.00 - 140.00 mg/dL   HIV 1/2 Ag/Ab (4th Gen)    Collection Time: 08/03/23  7:55 AM   Result Value Ref Range    HIV Nonreactive Nonreactive   Hepatitis C Antibody    Collection Time: 08/03/23  7:55 AM   Result Value Ref Range    Hep C Ab Interp Reactive (A) Nonreactive   TSH    
Collection Time: 08/03/23  7:55 AM   Result Value Ref Range    Thyroid Stimulating Hormone 1.689 0.350 - 4.940 uIU/mL   Hemoglobin A1C    Collection Time: 08/03/23  7:55 AM   Result Value Ref Range    Hemoglobin A1c 4.3 <=7.0 %    Estimated Average Glucose 76.7 mg/dL   CBC with Differential    Collection Time: 08/03/23  7:55 AM   Result Value Ref Range    WBC 4.43 (L) 4.50 - 11.50 x10(3)/mcL    RBC 3.89 (L) 4.20 - 5.40 x10(6)/mcL    Hgb 13.4 12.0 - 16.0 g/dL    Hct 43.7 37.0 - 47.0 %    .3 (H) 80.0 - 94.0 fL    MCH 34.4 (H) 27.0 - 31.0 pg    MCHC 30.7 (L) 33.0 - 36.0 g/dL    RDW 15.9 11.5 - 17.0 %    Platelet 115 (L) 130 - 400 x10(3)/mcL    MPV 11.1 (H) 7.4 - 10.4 fL    Neut % 63.4 %    Lymph % 20.1 %    Mono % 13.1 %    Eos % 2.0 %    Basophil % 0.9 %    Lymph # 0.89 0.6 - 4.6 x10(3)/mcL    Neut # 2.81 2.1 - 9.2 x10(3)/mcL    Mono # 0.58 0.1 - 1.3 x10(3)/mcL    Eos # 0.09 0 - 0.9 x10(3)/mcL    Baso # 0.04 <=0.2 x10(3)/mcL    IG# 0.02 0 - 0.04 x10(3)/mcL    IG% 0.5 %    NRBC% 0.0 %          No flowsheet data found.  Fall Risk Assessment - Outpatient 8/8/2023 6/20/2023 6/8/2023 5/30/2023 4/18/2023 2/9/2023 1/11/2023   Mobility Status Ambulatory Ambulatory Ambulatory Ambulatory Ambulatory Ambulatory Ambulatory   Number of falls 0 0 0 0 0 0 0   Identified as fall risk 0 0 0 0 0 0 0              Assessment/Plan:     1. Wellness examination  Lab work reviewed with patient  Continue current medication  Continue diet/exercise  Advanced directive discussed with patient  Return to clinic with any concerns    Advance Care Planning     Date: 08/08/2023    Living Will  During this visit, I engaged the patient  in the voluntary advance care planning process.  The patient and I reviewed the role for advance directives and their purpose in directing future healthcare if the patient's unable to speak for him/herself.  At this point in time, the patient does have full decision-making capacity.  We discussed different extreme 
health states that she could experience, and reviewed what kind of medical care she would want in those situations.  The patient communicated that if she were comatose and had little chance of a meaningful recovery, she would not want machines/life-sustaining treatments used. I spent a total of 5 minutes engaging the patient in this advance care planning discussion.     2. Dysphagia, unspecified type  -     Ambulatory referral/consult to Gastroenterology; Future; Expected date: 08/15/2023  Refer patient to Dr. Alcantar     3. Chest wall pain  -     acetaminophen-codeine 300-30mg (TYLENOL #3) 300-30 mg Tab; Take 1 tablet by mouth every 8 (eight) hours as needed (chest wall pain).  Dispense: 20 tablet; Refill: 0  Chest x-ray results reviewed with patient   Rx for Tylenol No. 3 p.r.n. pain  Keep scheduled appointment with Dr. Osborne         Medicare Annual Wellness and Personalized Prevention Plan:   Fall Risk + Home Safety + Hearing Impairment + Depression Screen + Opioid and Substance Abuse Screening + Cognitive Impairment Screen + Health Risk Assessment all reviewed.     Health Maintenance Topics with due status: Not Due       Topic Last Completion Date    Colorectal Cancer Screening 12/25/2021    Influenza Vaccine 09/30/2022    Hemoglobin A1c (Diabetic Prevention Screening) 08/03/2023    Lipid Panel 08/03/2023      The patient's Health Maintenance was reviewed and the following appears to be due at this time:   Health Maintenance Due   Topic Date Due    Sign Pain Contract  Never done    Complete Opioid Risk Tool  Never done    Shingles Vaccine (1 of 2) Never done    TETANUS VACCINE  09/21/2015    COVID-19 Vaccine (3 - Pfizer series) 10/18/2021    Mammogram  10/19/2022       Advance Care Planning   I attest to discussing Advance Care Planning with patient and/or family member.  Education was provided including the importance of the Health Care Power of , Advance Directives, and/or [place] 
documentation.  The patient expressed understanding to the importance of this information and discussion.         Follow up in about 6 months (around 2/8/2024). In addition to their scheduled follow up, the patient has also been instructed to follow up on as needed basis.       
Patient will report improvement in mood and sxs

## 2024-11-06 NOTE — ED BEHAVIORAL HEALTH ASSESSMENT NOTE - NS ED BHA REVIEW OF ED CHART VITAL SIGNS REVIEWED
Middle Ear Infection (Adult)  You have an infection of the middle ear, the space behind the eardrum. This is also called acute otitis media (AOM). Sometimes it is caused by the common cold. This is because congestion can block the internal passage (eustachian tube) that drains fluid from the middle ear. When the middle ear fills with fluid, bacteria can grow there and cause an infection. Oral antibiotics are used to treat this illness, not ear drops. Symptoms usually start to improve within 1 to 2 days of treatment.    Home care  The following are general care guidelines:  Finish all of the antibiotic medicine given, even though you may feel better after the first few days.  You may use over-the-counter medicine, such as acetaminophen or ibuprofen, to control pain and fever, unless something else was prescribed. If you have chronic liver or kidney disease or have ever had a stomach ulcer or gastrointestinal bleeding, talk with your healthcare provider before using these medicines. Do not give aspirin to anyone under 18 years of age who has a fever. It may cause severe illness or death.  Follow-up care  Follow up with your healthcare provider, or as advised, in 2 weeks if all symptoms have not gotten better, or if hearing doesn't go back to normal within 1 month.  When to seek medical advice  Call your healthcare provider right away if any of these occur:  Ear pain gets worse or does not improve after 3 days of treatment  Unusual drowsiness or confusion  Neck pain, stiff neck, or headache  Fluid or blood draining from the ear canal  Fever of 100.4°F (38°C) or as advised   Seizure  Date Last Reviewed: 6/1/2016  © 2065-3646 [employer]. [address] [place] [address], [place] [address]. All rights reserved. This information is not intended as a substitute for professional medical care. Always follow your healthcare professional's instructions.      
Yes

## 2025-07-03 NOTE — BH SOCIAL WORK INITIAL PSYCHOSOCIAL EVALUATION - NSBHSUPPORTCONTACTLEVEL_PSY_ALL_CORE
Follow up in 3 months with labs to be done PRIOR.    It was a pleasure to see you today. Thank you for choosing us for your health care needs.    If you have lab or other testing completed and have not been informed of results within one week, please call the office for your results.    If you haven't done so, consider signing up for [facility], the [facility] personal health record. Ask the staff how you can get started.    
Frequent